# Patient Record
Sex: FEMALE | Race: WHITE | NOT HISPANIC OR LATINO | Employment: OTHER | URBAN - METROPOLITAN AREA
[De-identification: names, ages, dates, MRNs, and addresses within clinical notes are randomized per-mention and may not be internally consistent; named-entity substitution may affect disease eponyms.]

---

## 2017-01-26 ENCOUNTER — APPOINTMENT (OUTPATIENT)
Dept: MEDICAL GROUP | Facility: MEDICAL CENTER | Age: 82
End: 2017-01-26
Payer: MEDICARE

## 2017-08-22 ENCOUNTER — OFFICE VISIT (OUTPATIENT)
Dept: MEDICAL GROUP | Facility: MEDICAL CENTER | Age: 82
End: 2017-08-22
Payer: MEDICARE

## 2017-08-22 ENCOUNTER — TELEPHONE (OUTPATIENT)
Dept: MEDICAL GROUP | Facility: MEDICAL CENTER | Age: 82
End: 2017-08-22

## 2017-08-22 VITALS
DIASTOLIC BLOOD PRESSURE: 58 MMHG | WEIGHT: 99.6 LBS | SYSTOLIC BLOOD PRESSURE: 110 MMHG | TEMPERATURE: 98.4 F | HEART RATE: 74 BPM | OXYGEN SATURATION: 94 % | BODY MASS INDEX: 19.56 KG/M2 | HEIGHT: 60 IN

## 2017-08-22 DIAGNOSIS — E78.5 DYSLIPIDEMIA: Chronic | ICD-10-CM

## 2017-08-22 DIAGNOSIS — D50.0 IRON DEFICIENCY ANEMIA DUE TO CHRONIC BLOOD LOSS: Chronic | ICD-10-CM

## 2017-08-22 DIAGNOSIS — Z00.00 MEDICARE ANNUAL WELLNESS VISIT, SUBSEQUENT: ICD-10-CM

## 2017-08-22 DIAGNOSIS — K21.9 GASTROESOPHAGEAL REFLUX DISEASE WITHOUT ESOPHAGITIS: Chronic | ICD-10-CM

## 2017-08-22 DIAGNOSIS — Z91.81 RISK FOR FALLS: ICD-10-CM

## 2017-08-22 DIAGNOSIS — F03.90 DEMENTIA WITHOUT BEHAVIORAL DISTURBANCE, UNSPECIFIED DEMENTIA TYPE: Chronic | ICD-10-CM

## 2017-08-22 DIAGNOSIS — Z87.81 HISTORY OF COMPRESSION FRACTURE OF SPINE: Chronic | ICD-10-CM

## 2017-08-22 DIAGNOSIS — M85.80 OSTEOPENIA, UNSPECIFIED LOCATION: Chronic | ICD-10-CM

## 2017-08-22 PROCEDURE — G0439 PPPS, SUBSEQ VISIT: HCPCS | Performed by: INTERNAL MEDICINE

## 2017-08-22 ASSESSMENT — PATIENT HEALTH QUESTIONNAIRE - PHQ9: CLINICAL INTERPRETATION OF PHQ2 SCORE: 0

## 2017-08-22 NOTE — MR AVS SNAPSHOT
"        Arminda Cowart   2017 3:00 PM   Office Visit   MRN: [id number]    Department:  [facility]   Dept Phone:  [phone]    Description:  Female : 1923   Provider:  Franck Cordero M.D.           Reason for Visit     Annual Exam           Allergies as of 2017     Allergen Noted Reactions    Boniva [Ibandronate Sodium] 2009       Pt denies    Doxycycline 2009       Pt denies    Evista [Raloxifene Hydrochloride] 2009       Pt denies    Fosamax 2009       Pt denies    Lovastatin 2009       Pt denies    Penicillins 2009       \"possibly\" reaction unk  Pt denies    Sulfa Drugs 2009       Pt denies      You were diagnosed with     Risk for falls   [125715]       Medicare annual wellness visit, subsequent   [346724]       Dyslipidemia   [361254]       Osteopenia, unspecified location   [2660521]       Iron deficiency anemia due to chronic blood loss   [369729]       Dementia without behavioral disturbance, unspecified dementia type   [7000675]       Gastroesophageal reflux disease without esophagitis   [298362]         Vital Signs     Blood Pressure Pulse Temperature Height Weight Body Mass Index    110/58 mmHg 74 36.9 °C (98.4 °F) 1.524 m (5') 45.178 kg (99 lb 9.6 oz) 19.45 kg/m2    Oxygen Saturation Smoking Status                94% Never Smoker           Basic Information     Date Of Birth Sex Race Ethnicity Preferred Language    1923 Female White Non- English      Your appointments     Sep 28, 2017  2:00 PM   Established Patient with Franck Cordero M.D.   [facility])    [address]  [facility] [address] [phone]           You will be receiving a confirmation call a few days before your appointment from our automated call confirmation system.              Problem List              ICD-10-CM Priority Class Noted - Resolved    History of humerus fracture Z87.81   2009 " - Present    History of compression fracture of spine (Chronic) Z87.81   7/22/2009 - Present    Osteopenia (Chronic) M85.80   7/22/2009 - Present    Dyslipidemia (Chronic) E78.5   7/22/2009 - Present    Preventative health care (Chronic) Z00.00   7/22/2009 - Present    History of shingles Z86.19   5/1/2012 - Present    GERD (gastroesophageal reflux disease) (Chronic) K21.9   5/9/2012 - Present    Radial fracture S52.90XA   4/3/2014 - Present    Dementia without behavioral disturbance (Chronic) F03.90   2/18/2016 - Present    Anemia (Chronic) D64.9   2/20/2016 - Present      Health Maintenance        Date Due Completion Dates    IMM ZOSTER VACCINE 11/14/1983 ---    IMM PNEUMOCOCCAL 65+ (ADULT) LOW/MEDIUM RISK SERIES (1 of 2 - PCV13) 11/14/1988 ---    IMM DTaP/Tdap/Td Vaccine (1 - Tdap) 8/29/2009 8/28/2009    IMM INFLUENZA (1) 9/1/2017 11/5/2014    BONE DENSITY 11/6/2019 11/6/2014 (Declined), 5/6/2014 (Declined), 2/3/2010, 10/17/2007    Override on 11/6/2014: Patient Declined    Override on 5/6/2014: Patient Declined            Current Immunizations     Influenza TIV (IM) 11/5/2014    TD Vaccine 8/28/2009 11:18 PM      Below and/or attached are the medications your provider expects you to take. Review all of your home medications and newly ordered medications with your provider and/or pharmacist. Follow medication instructions as directed by your provider and/or pharmacist. Please keep your medication list with you and share with your provider. Update the information when medications are discontinued, doses are changed, or new medications (including over-the-counter products) are added; and carry medication information at all times in the event of emergency situations     Allergies:  BONIVA - (reactions not documented)     DOXYCYCLINE - (reactions not documented)     EVISTA - (reactions not documented)     FOSAMAX - (reactions not documented)     LOVASTATIN - (reactions not documented)     PENICILLINS - (reactions not  documented)     SULFA DRUGS - (reactions not documented)               Medications  Valid as of: August 22, 2017 -  3:52 PM    Generic Name Brand Name Tablet Size Instructions for use    Calcium (Tab) Calcium 150 MG Take  by mouth.        Cholecalciferol (Tab) cholecalciferol 1000 UNIT Take 4 Tabs by mouth every day.        Lidocaine (Patch) LIDODERM 5 % Apply 1 patch to affected area 12 hours on during the day and off at night        Multiple Vitamin   Take  by mouth.        .                 Medicines prescribed today were sent to:     [facility] #[id number] - [place], [place] - [address]    [address] [place] [address]    Phone: [phone] Fax: [phone]    Open 24 Hours?: No      Medication refill instructions:       If your prescription bottle indicates you have medication refills left, it is not necessary to call your provider’s office. Please contact your pharmacy and they will refill your medication.    If your prescription bottle indicates you do not have any refills left, you may request refills at any time through one of the following ways: The online [employer] system (except Urgent Care), by calling your provider’s office, or by asking your pharmacy to contact your provider’s office with a refill request. Medication refills are processed only during regular business hours and may not be available until the next business day. Your provider may request additional information or to have a follow-up visit with you prior to refilling your medication.   *Please Note: Medication refills are assigned a new Rx number when refilled electronically. Your pharmacy may indicate that no refills were authorized even though a new prescription for the same medication is available at the pharmacy. Please request the medicine by name with the pharmacy before contacting your provider for a refill.        Other Notes About Your Plan     Call son [phone] rasheeda daughter bradley [phone]  8/14/17 Complex  case coordination evaluation assisted living, patient could not tolerate Aricept , ask about home instead few hours per week, depends, need labs  Prevnar,dexa                       MyChart Access Code: Activation code not generated  Current MyChart Status: Active

## 2017-08-22 NOTE — PROGRESS NOTES
Subjective:      Arminda Cowart is a 89 y.o. female who presents with Annual Exam            Annual Exam      CC:  Health Risk Assessment Exam.    HPI:wellness assessment  [place] is a 89 y.o. here for Health Risk Assessment.     PCP: Franck Cordero M.D.  Lives at [facility] by self, patient is here with daughter  Patient has more issues with memory, daughter believes she has power of  over medical, legal affairs and patient has advanced directive at home  Has meals on wheels once per week and once a month house cleaning and son helps with grocery shopping  Walker for ambulation has fallen a few weeks ago when she tripped  Patient's son lives a few blocks away and talks with the patient daily also does the shopping  Patient has not been taking Aricept  Daughter has noticed more short-term memory issues          Patient Active Problem List    Diagnosis Date Noted   • Anemia 02/20/2016   • Dementia without behavioral disturbance 02/18/2016   • Radial fracture 04/03/2014   • GERD (gastroesophageal reflux disease) 05/09/2012   • History of shingles 05/01/2012   • History of humerus fracture 07/22/2009   • History of compression fracture of spine 07/22/2009   • Osteopenia 07/22/2009   • Dyslipidemia 07/22/2009   • Preventative health care 07/22/2009     Current Outpatient Prescriptions   Medication Sig Dispense Refill   • vitamin D (CHOLECALCIFEROL) 1000 UNIT Tab Take 4 Tabs by mouth every day. 30 Tab 0   • Calcium 150 MG TABS Take  by mouth.     • Multiple Vitamin (MULTI-VITAMIN DAILY PO) Take  by mouth.     • donepezil (ARICEPT) 10 MG tablet Take 1 Tab by mouth every day. 30 Tab 3   • lidocaine (LIDODERM) 5 % Patch Apply 1 patch to affected area 12 hours on during the day and off at night 90 Patch 1     No current facility-administered medications for this visit.      Current supplements reviewed  Chronic narcotic pain medicines: no  Allergies: Boniva; Doxycycline; Evista; Fosamax; Lovastatin; Penicillins;  and Sulfa drugs    Screening:reviewed  Depressive Symptoms: Denies feeling down, depressed or hopeless. Denies loss of interest or pleasure in doing things .  Denies depression  ADLs: Denies needing help with using telephone, cyanosis transportation, does prepare meals with [employer] Whe[employer] helping, housework once per month [employer], laundry, son manages finances  Independent with bathing, hygiene, feeding, toileting, dressing    Memory concerns: Denies difficulty remembering details of conversations, events and upcoming appointments.  Hearing problems: Denies.   Recent falls no    Current social contact/activities: reviewed  Ostomy or other tubes or amputations: no  Chronic oxygen use no     Gait: normal. Uses assistive device : walker         Health Care Screening recommendations reviewed with patient today and updated or ordered.  DPA/Advanced directive: Completed/Information provided.   Referrals for PT/OT/Nutrition counseling/Behavioral Health/Smoking cessation as above if indicated  Discussion today about general wellness and lifestyle habits:    · Prevent falls and reduce trip hazards; walker for ambulation  · Have a working fire alarm    · Does spend time with neighbors  · Rare outdoor activities  ·   ·     Current Outpatient Prescriptions   Medication Sig Dispense Refill   • donepezil (ARICEPT) 10 MG tablet Take 1 Tab by mouth every day. 30 Tab 3   • vitamin D (CHOLECALCIFEROL) 1000 UNIT Tab Take 4 Tabs by mouth every day. 30 Tab 0   • lidocaine (LIDODERM) 5 % Patch Apply 1 patch to affected area 12 hours on during the day and off at night 90 Patch 1   • Calcium 150 MG TABS Take  by mouth.     • Multiple Vitamin (MULTI-VITAMIN DAILY PO) Take  by mouth.       No current facility-administered medications for this visit.     Patient Active Problem List    Diagnosis Date Noted   • Anemia 02/20/2016   • Dementia without behavioral disturbance 02/18/2016   • Radial fracture 04/03/2014   • GERD (gastroesophageal  reflux disease) 05/09/2012   • History of shingles 05/01/2012   • History of humerus fracture 07/22/2009   • History of compression fracture of spine 07/22/2009   • Osteopenia 07/22/2009   • Dyslipidemia 07/22/2009   • Preventative health care 07/22/2009     Anemia  4/29/15 hgb 13,hct 41  2/19/16 hgb 7.0,hct 25,mcv 77,b12 36,folate >24,ESR 15, cmp, tsh normal  2/20/16 chest x-ray atelectasis, cardiomegaly  2/20/16 hospital admission, 2/21/16, hospital discharge  Diagnosis anemia, admitted with hemoglobin 6.7, transfuse one unit packed red blood cells, patient declined EGD and colonoscopy, recommend follow-up outpatient  2/20/16 hgb 6.7,hct 24,mcv 77,iron <10,%saturation not calculated due to iron <10,feritin 8.4  2/21/16 hgb 7.7,hct 26,mcv 80  3/3/16 hgb 8.8,hct 26  3/25/16 completed day 5/5 venofer iron infusion  5/5/16 hgb 12.6,hct 42,mcv 89,iron 46,%sat 18,ferritin 326  7/27/16 hgb 13.7,hct 44.7,iron 73,%sat 20,ferritin 67,b12 252,SPEP negative    Dementia  2/18/16 MMSE 20/30 labs and CT pending, if negative consider aricept  2/19/16 hgb 7.0,hct 25,mcv 77,b12 36,folate >24,ESR 15, cmp, tsh normal  7/26/16 start aricept 5 mg  8/16/16 increase to aricept 10 mg  8/14/17 not taking aricept nausea    Dyslipidemia  3/09 chol 196,trig 74,hdl 51,ldl 130  1/10 chol 229,trig 80,hdl 52,ldl 161  11/10 chol 235,trig 63,hdl 68,ldl 154 decline statin  8/11 chol 221,trig 63,hdl 57,ldl 151 decline statin  10/8/13 chol 204,trig 75,hdl 74,ldl 115   11/10/14 chol 208,trig 52,hdl 79,ldl 119    Gastroesophageal reflux  8/3/12 stop prilosec due to osteopenia and vertebral compression fractures    History compression fracture  1/08 lumbar spine x-ray, which compression deformity T11-T12  5/6/14 x-ray lumbar spine superior endplate irregularity L5 consistent with mild compression deformity uncertain age, no other compression fractures moderate to severe arthrosis  5/6/14 physical therapy for low back pain, mild L5 superior endplate  fracture, age unknown. Declines reclast or prolia  3/31/15 x-ray lumbar spine interval progression L5 superior endplate compression fracture with 30% height loss, chronic compression fractures of thoracic spine, DJD  4/1/15 consider tramadol for pain, calcitonin for pain from compression fracture after tramadol, ask her again about DEXA she has declined that and all treatment in the past hopefully pain can be controlled otherwise vertebral augmentation could be considered  4/27/15 dexa at [facility] LS -1.8,hip -1.8  4/28/15  orthopedic note, MRI lumbar spine reviewed moderate stenosis L4-L5, acute L5 fracture, we will proceed with kyphoplasty L5   4/29/15  kyphoplasty L5  4/29/15 xray lumbar spine, contrast material L5 consistent with kyphoplasty  5/12/15 dr.men pfeiffer ortho note; status post kyphoplasty L5, x-ray lumbar spine cement within L5 vertebral body, minimal leakage, still with pain from L4-L5 stenosis, consider epidural L4-L5, physical therapy  6/11/15 declines miacalcin  7/26/16 declines prolia and reclast    History humerus fracture  Sees dr.osgood  2/09 right humerus hemiarthroplasty rotator cuff repair     History shingles    Osteopenia  9/07 dexa LS -1.6,hip -1.4 intolerant of boniva,fosamax,evista,she needs to try reclast  1/10 vit d 34  2/10 dexa LS -1.8,hip -1.6  11/10 vit d 36   5/12 x-ray thoracic spine 2 chronic lower thoracic compression fractures T9 and T11, more superior compression fractures, resulting loss of height and kyphosis  5/9/12 I rec reclast, pt declines for now  5/19/12 start fosamax q week, rec also use zantac instead of prilosec  10/8/13 not taking fosamax, vit d 41  3/29/14 xray right wrist minimally impacted fracture of the distal radius, with slight posterior angulation of the major distal fragments  5/6/14 x-ray thoracic spine severe kyphosis, severe compression fracture midthoracic vertebral body no change, moderate compression fracture 2 levels below  without change. No new fractures are noted  5/6/14 x-ray lumbar spine superior endplate irregularity L5 consistent with mild compression deformity uncertain age, no other compression fractures moderate to severe arthrosis  5/6/14 declines reclast, prolia, dexa  11/6/14 declines bone density testing, declines reclast and prolia  11/10/14 vit d 38  3/31/15 x-ray lumbar spine interval progression L5 superior endplate compression fracture with 30% height loss, chronic compression fractures of thoracic spine, DJD  4/27/15 cbc,cmp,tsh normal, vit d 30, PTH 22.7  4/27/15 dexa at [facility] LS -1.8,hip -1.8  4/29/15  kyphoplasty L5   6/11/15 declines reclast or prolia  7/26/16 declines prolia or intravenous reclast  7/27/16 vit d 27, increase to 4000 units daily  intolerant of boniva,fosamax?,evista and declines reclast or prolia    Preventative health  2008 pneumovax  11/10/14 vit d 38  4/27/15 dexa at [facility] LS -1.8,hip -1.8  6/11/15 declines influenza vaccine, pneumococcal vaccine, shingles vaccine  7/26/16 written prevnar prescription provided to get at pharmacy  7/27/16 vit d 27, increase to 4000 units daily    Radial fracture  3/29/14 xray right wrist minimally impacted fracture of the distal radius, with slight posterior angulation of the major distal fragments  4/3/14  ortho note short armed splint  4/16/14  ortho note, cont splint  5/14/14  ortho note,cont splint follow up one month  6/4/14  ortho note        Depression Screening    Little interest or pleasure in doing things?  0 - not at all  Feeling down, depressed , or hopeless? 0 - not at all  Patient Health Questionnaire Score: 0     If depressive symptoms identified deferred to follow up visit unless specifically addressed in assessment and plan.    Interpretation of PHQ-9 Total Score   Score Severity   1-4 No Depression   5-9 Mild Depression   10-14 Moderate Depression   15-19 Moderately Severe Depression   20-27  Severe Depression    Screening for Cognitive Impairment    Three Minute Recall (apple, watch, simon)  1/3    Draw clock face with all 12 numbers set to the hand to show 10 minutes past 11 o'clock  0    Cognitive concerns identified deferred for follow up unless specifically addressed in assessment and plan.    Fall Risk Assessment    Has the patient had two or more falls in the last year or any fall with injury in the last year?  Yes    Safety Assessment    Throw rugs on floor.  No  Handrails on all stairs.  Yes  Good lighting in all hallways.  Yes  Difficulty hearing.  No  Patient counseled about all safety risks that were identified.    Functional Assessment ADLs    Are there any barriers preventing you from cooking for yourself or meeting nutritional needs?  Yes.    Are there any barriers preventing you from driving safely or obtaining transportation?  Yes.    Are there any barriers preventing you from using a telephone or calling for help?  No.    Are there any barriers preventing you from shopping?  No.    Are there any barriers preventing you from taking care of your own finances?  Yes.    Are there any barriers preventing you from managing your medications?  Yes.    Are currently engaging any exercise or physical activity?  Yes.       Health Maintenance Summary                IMM ZOSTER VACCINE Overdue 11/14/1983     IMM PNEUMOCOCCAL 65+ (ADULT) LOW/MEDIUM RISK SERIES Overdue 11/14/1988     IMM DTaP/Tdap/Td Vaccine Overdue 8/29/2009      Done 8/28/2009 Imm Admin: TD Vaccine    Annual Wellness Visit Overdue 10/8/2014      Done 10/7/2013     IMM INFLUENZA Next Due 9/1/2017      Done 11/5/2014 Imm Admin: Influenza TIV (IM)    BONE DENSITY Next Due 11/6/2019      Patient Declined 11/6/2014      Patient has more history with this topic...          Patient Care Team:  Franck Cordero M.D. as PCP - General          ROS       Objective:          Physical Exam   Constitutional: She appears well-developed and  well-nourished. No distress.   HENT:   Head: Normocephalic and atraumatic.   Eyes: Conjunctivae are normal. Right eye exhibits no discharge. Left eye exhibits no discharge. No scleral icterus.   Neck: No JVD present. No thyromegaly present.   Cardiovascular: Normal rate and regular rhythm.    Pulmonary/Chest: Effort normal and breath sounds normal. No respiratory distress. She has no wheezes.   Abdominal: She exhibits no distension.   Musculoskeletal: She exhibits no edema.   Skin: Skin is warm. She is not diaphoretic.   Psychiatric: She has a normal mood and affect. Her behavior is normal.   Nursing note and vitals reviewed.    Head no trauma, no tenderness  Patient's repetitive, no hallucinations or delusions  Answers questions appropriately          Assessment/Plan:     Assessment  #! Wellness assessment    #2 dementia could not tolerate Aricept because of nausea, lives independently but needs assistance son lives in the same apartment complex and daughter visits from out of state once a month, short-term memory loss, no change in behavior, some diminished function     #3 History compression fracture status post kyphoplasty 5/12/15 dr.men pfeiffer ortho note; status post kyphoplasty L5, x-ray lumbar spine cement within L5 vertebral body, minimal leakage, still with pain from L4-L5 stenosis, consider epidural L4-L5, physical therapy, previously has declined medication 7/26/16 declines prolia and reclast    #4 History humerus fracture 2/09 right humerus hemiarthroplasty rotator cuff repair , walker for ambulation    #5 osteopenia has declined medication 7/26/16 declines prolia or intravenous reclast     #6 Preventative health  2008 pneumovax  11/10/14 vit d 38  4/27/15 dexa at [facility] LS -1.8,hip -1.8  6/11/15 declines influenza vaccine, pneumococcal vaccine, shingles vaccine  7/26/16 written prevnar prescription provided to get at pharmacy  7/27/16 vit d 27, increase to 4000 units daily    #7 fall risk     Plan  #!  Need copy of the medical power of , spoke to daughter and son later on this evening, he will bring a copy to the next visit power of , as well as advanced directive    #2 health maintenance reviewed and updated    #3 annual influenza vaccine    #4 note to  to contact patient's son to see if any other assistance is necessary, spoke to son this evening he will try to arrange home instead, already has meals on wheels and [employer] once a month    #5 due for dexa but she has declined medications previously    #6 no further mammogram    #7 recommend pneumonia vaccine, she will consider    #8 follow-up 4 weeks with patient son and daughter    #9 I spent over half of the appointment the importance of power of , advanced directive, potentially getting home aide assistance or transitioning to assisted living, this was explained to both the patient and the daughter multiple times, emphasize fall risk precautions, using walker at all times, patient already has [employer] screening, patient does have shower bars and shower chair    #10 no need for hearing test at this time, patient declines physical therapy    #11 consider exelon patch

## 2017-08-23 ENCOUNTER — PATIENT OUTREACH (OUTPATIENT)
Dept: HEALTH INFORMATION MANAGEMENT | Facility: OTHER | Age: 82
End: 2017-08-23

## 2017-08-23 ENCOUNTER — TELEPHONE (OUTPATIENT)
Dept: MEDICAL GROUP | Facility: MEDICAL CENTER | Age: 82
End: 2017-08-23

## 2017-08-23 DIAGNOSIS — Z59.9 INADEQUATE COMMUNITY RESOURCES: ICD-10-CM

## 2017-08-23 SDOH — ECONOMIC STABILITY - INCOME SECURITY: PROBLEM RELATED TO HOUSING AND ECONOMIC CIRCUMSTANCES, UNSPECIFIED: Z59.9

## 2017-08-23 NOTE — TELEPHONE ENCOUNTER
Spoke to son Satish bautista at [phone], he will attempt to set up [employer] assistance, already has Meals on Wheels once a week, patient cannot afford assisted living    Will refer to  since she has Medicare and complex case coordination is not covered.  Son agrees

## 2017-08-23 NOTE — TELEPHONE ENCOUNTER
----- Message from Patricia Washington sent at 8/23/2017 11:54 AM PDT -----  Regarding: RE:  consult  Dr. Cordero,     The type of Medicare she has does in fact have complex care coordination as one of their benefits. So no worries there :)     It's certain types of Medicare, which I know can be confusing. If you have anyone who you think needs complex case coordination, send the referral through the work que and we will determine if their insurance is contracted with our services. That way you don't get confused with who we do follow and who we don't follow. I hope this helps.     Thanks,   Patricia     ----- Message -----     From: Franck Cordero M.D.     Sent: 8/22/2017   9:23 PM       To: Patricia Washington  Subject:  consult                          Patricia,     I apologize for sending you messages but many of my patients have Medicare, and complex case coordination is not covered for the Medicare patients.    Ms. Cowart lives alone, her son lives nearby and tries to assist as much as possible, would you mind contacting him to see what services are available?    Apparently assisted living is not within her financial availability but ultimately that is what she may require.    His name is Satish and I notified him that you may be contacting him.  He is available at [phone].    Thank you again for all of your assistance.    Best regards,  Franck

## 2017-08-28 ENCOUNTER — PATIENT OUTREACH (OUTPATIENT)
Dept: HEALTH INFORMATION MANAGEMENT | Facility: OTHER | Age: 82
End: 2017-08-28

## 2017-08-28 NOTE — PROGRESS NOTES
1st attempt: New referral from provider indicating pt may be in need of further community resources, including assisted living.     Outreach to pt to discuss above referral. Pt's daughter Hansa answered and informed this LSW that she makes decisions for pt. LSW inquired if pt is alert and oriented. Daughter reports that she has some forgetfulness, but that she is alert and oriented. LSW discussed that because pt is alert and oriented, LSW would need permission from pt to discuss pt's medical/social care with daughter. LSW informed daughter that LSW could provide her with general community resources for them to provide to patient; however if they had any specific referrals or were in need of pt's information, LSW will need permission from patient. Daughter agreeable and requested LSW contact information to call back when she is not driving. Hansa states she will be back in town on 9/28/2017 to assist patient with stuff at home. Hansa states she will call this LSW back in the next day or so to ask some questions and possibly get some general community resources.

## 2017-08-29 ENCOUNTER — PATIENT OUTREACH (OUTPATIENT)
Dept: HEALTH INFORMATION MANAGEMENT | Facility: OTHER | Age: 82
End: 2017-08-29

## 2017-08-29 NOTE — PROGRESS NOTES
LSW received VM from pt's daughter Hansa requesting TC back to discuss community resources available.     Outreach to pt's daughter returning her VM. Hansa reports that currently pt has [employer] for  services, [employer] for cleaning, and [employer] for home delivered meals. She states that pt is currently living at [facility]. She reports that pt does not have any ADL needs, but states she anticipates that pt will need more assistance in the future. She reports that her and pt have looked at [facility] and pt may be interested in this facility.     Daughter requesting information on group homes in case pt is unable to pay for an assisted living facility long term. LSW to send link to online search for group homes to pt's daughter. LSW discussed some of the state/[facility] programs for assistance with ADL/IADL needs should pt's needs change and she is unable to privately pay for services.     Email to daughter as follows:        Good Afternoon,     As per our conversation, here is a link to look up group homes based on location. In order to find groups home you will choose the “Business Unit” as Health Facilities and then the “Credential Type” as either: 1) Residential facility for groups or 2) Home for individual residential care.     [email or web address]    If you have any questions/concerns, you may reach me at the number or email listed below.     Thanks,  Patricia Washington, LSW, MSW

## 2017-09-28 ENCOUNTER — TELEPHONE (OUTPATIENT)
Dept: MEDICAL GROUP | Facility: MEDICAL CENTER | Age: 82
End: 2017-09-28

## 2017-09-28 ENCOUNTER — OFFICE VISIT (OUTPATIENT)
Dept: MEDICAL GROUP | Facility: MEDICAL CENTER | Age: 82
End: 2017-09-28
Payer: MEDICARE

## 2017-09-28 VITALS
TEMPERATURE: 98 F | BODY MASS INDEX: 22.04 KG/M2 | WEIGHT: 98 LBS | HEIGHT: 56 IN | OXYGEN SATURATION: 93 % | SYSTOLIC BLOOD PRESSURE: 106 MMHG | HEART RATE: 83 BPM | DIASTOLIC BLOOD PRESSURE: 68 MMHG

## 2017-09-28 DIAGNOSIS — Z87.81 HISTORY OF HUMERUS FRACTURE: ICD-10-CM

## 2017-09-28 DIAGNOSIS — E78.5 DYSLIPIDEMIA: Chronic | ICD-10-CM

## 2017-09-28 DIAGNOSIS — D50.0 IRON DEFICIENCY ANEMIA DUE TO CHRONIC BLOOD LOSS: Chronic | ICD-10-CM

## 2017-09-28 DIAGNOSIS — M85.80 OSTEOPENIA, UNSPECIFIED LOCATION: Chronic | ICD-10-CM

## 2017-09-28 DIAGNOSIS — Z23 NEEDS FLU SHOT: ICD-10-CM

## 2017-09-28 DIAGNOSIS — F03.90 DEMENTIA WITHOUT BEHAVIORAL DISTURBANCE, UNSPECIFIED DEMENTIA TYPE: Chronic | ICD-10-CM

## 2017-09-28 PROCEDURE — 99214 OFFICE O/P EST MOD 30 MIN: CPT | Mod: 25 | Performed by: INTERNAL MEDICINE

## 2017-09-28 PROCEDURE — 90662 IIV NO PRSV INCREASED AG IM: CPT | Performed by: INTERNAL MEDICINE

## 2017-09-28 PROCEDURE — G0008 ADMIN INFLUENZA VIRUS VAC: HCPCS | Performed by: INTERNAL MEDICINE

## 2017-09-28 NOTE — PROGRESS NOTES
Subjective:      Arminda Cowart is a 89 y.o. female who presents with memory Follow-Up            HPI     Patient with history of Alzheimer's dementia, here with son and daughter who have power of .  Patient lives by herself has Me[employer] Wheels, son lives close by and monitors situation, will be moving into assisted living [facility] a few months, and needs forms completed as well as POLST.  Patient has not advanced directive states she is DO NOT RESUSCITATE, daughter and son agree.  Patient has memory loss, has tried Aricept which caused nausea, short-term memory diminished, no hallucinations or delusions, no change in function.  Able to perform ADLs without assistance.  Denies depression.  No mood changes.  No anxiety or stress.  Walker for ambulation, no falls.  No tobacco, no alcohol          Current Outpatient Prescriptions   Medication Sig Dispense Refill   • vitamin D (CHOLECALCIFEROL) 1000 UNIT Tab Take 4 Tabs by mouth every day. 30 Tab 0   • Calcium 150 MG TABS Take  by mouth.     • Multiple Vitamin (MULTI-VITAMIN DAILY PO) Take  by mouth.     • lidocaine (LIDODERM) 5 % Patch Apply 1 patch to affected area 12 hours on during the day and off at night 90 Patch 1     No current facility-administered medications for this visit.      Patient Active Problem List    Diagnosis Date Noted   • Anemia 02/20/2016   • Dementia without behavioral disturbance 02/18/2016   • Radial fracture 04/03/2014   • GERD (gastroesophageal reflux disease) 05/09/2012   • History of shingles 05/01/2012   • History of humerus fracture 07/22/2009   • History of compression fracture of spine 07/22/2009   • Osteopenia 07/22/2009   • Dyslipidemia 07/22/2009   • Preventative health care 07/22/2009          Anemia  4/29/15 hgb 13,hct 41  2/19/16 hgb 7.0,hct 25,mcv 77,b12 36,folate >24,ESR 15, cmp, tsh normal  2/20/16 chest x-ray atelectasis, cardiomegaly  2/20/16 hospital admission, 2/21/16, hospital discharge  Diagnosis anemia,  admitted with hemoglobin 6.7, transfuse one unit packed red blood cells, patient declined EGD and colonoscopy, recommend follow-up outpatient  2/20/16 hgb 6.7,hct 24,mcv 77,iron <10,%saturation not calculated due to iron <10,feritin 8.4  2/21/16 hgb 7.7,hct 26,mcv 80  3/3/16 hgb 8.8,hct 26  3/25/16 completed day 5/5 venofer iron infusion  5/5/16 hgb 12.6,hct 42,mcv 89,iron 46,%sat 18,ferritin 326  7/27/16 hgb 13.7,hct 44.7,iron 73,%sat 20,ferritin 67,b12 252,SPEP negative     Dementia  2/18/16 MMSE 20/30 labs and CT pending, if negative consider aricept  2/19/16 hgb 7.0,hct 25,mcv 77,b12 36,folate >24,ESR 15, cmp, tsh normal  7/26/16 start aricept 5 mg  8/16/16 increase to aricept 10 mg  8/14/17 not taking aricept nausea     Dyslipidemia  3/09 chol 196,trig 74,hdl 51,ldl 130  1/10 chol 229,trig 80,hdl 52,ldl 161  11/10 chol 235,trig 63,hdl 68,ldl 154 decline statin  8/11 chol 221,trig 63,hdl 57,ldl 151 decline statin  10/8/13 chol 204,trig 75,hdl 74,ldl 115   11/10/14 chol 208,trig 52,hdl 79,ldl 119     Gastroesophageal reflux  8/3/12 stop prilosec due to osteopenia and vertebral compression fractures     History compression fracture  1/08 lumbar spine x-ray, which compression deformity T11-T12  5/6/14 x-ray lumbar spine superior endplate irregularity L5 consistent with mild compression deformity uncertain age, no other compression fractures moderate to severe arthrosis  5/6/14 physical therapy for low back pain, mild L5 superior endplate fracture, age unknown. Declines reclast or prolia  3/31/15 x-ray lumbar spine interval progression L5 superior endplate compression fracture with 30% height loss, chronic compression fractures of thoracic spine, DJD  4/1/15 consider tramadol for pain, calcitonin for pain from compression fracture after tramadol, ask her again about DEXA she has declined that and all treatment in the past hopefully pain can be controlled otherwise vertebral augmentation could be considered  4/27/15  dexa at [facility] LS -1.8,hip -1.8  4/28/15  orthopedic note, MRI lumbar spine reviewed moderate stenosis L4-L5, acute L5 fracture, we will proceed with kyphoplasty L5   4/29/15  kyphoplasty L5  4/29/15 xray lumbar spine, contrast material L5 consistent with kyphoplasty  5/12/15 dr.men pfeiffer ortho note; status post kyphoplasty L5, x-ray lumbar spine cement within L5 vertebral body, minimal leakage, still with pain from L4-L5 stenosis, consider epidural L4-L5, physical therapy  6/11/15 declines miacalcin  7/26/16 declines prolia and reclast     History humerus fracture  Sees dr.osgood  2/09 right humerus hemiarthroplasty rotator cuff repair      History shingles     Osteopenia  9/07 dexa LS -1.6,hip -1.4 intolerant of boniva,fosamax,evista,she needs to try reclast  1/10 vit d 34  2/10 dexa LS -1.8,hip -1.6  11/10 vit d 36   5/12 x-ray thoracic spine 2 chronic lower thoracic compression fractures T9 and T11, more superior compression fractures, resulting loss of height and kyphosis  5/9/12 I rec reclast, pt declines for now  5/19/12 start fosamax q week, rec also use zantac instead of prilosec  10/8/13 not taking fosamax, vit d 41  3/29/14 xray right wrist minimally impacted fracture of the distal radius, with slight posterior angulation of the major distal fragments  5/6/14 x-ray thoracic spine severe kyphosis, severe compression fracture midthoracic vertebral body no change, moderate compression fracture 2 levels below without change. No new fractures are noted  5/6/14 x-ray lumbar spine superior endplate irregularity L5 consistent with mild compression deformity uncertain age, no other compression fractures moderate to severe arthrosis  5/6/14 declines reclast, prolia, dexa  11/6/14 declines bone density testing, declines reclast and prolia  11/10/14 vit d 38  3/31/15 x-ray lumbar spine interval progression L5 superior endplate compression fracture with 30% height loss, chronic compression  "fractures of thoracic spine, DJD  4/27/15 cbc,cmp,tsh normal, vit d 30, PTH 22.7  4/27/15 dexa at [facility] LS -1.8,hip -1.8  4/29/15  kyphoplasty L5   6/11/15 declines reclast or prolia  7/26/16 declines prolia or intravenous reclast  7/27/16 vit d 27, increase to 4000 units daily  intolerant of boniva,fosamax?,evista and declines reclast or prolia     Preventative health  2008 pneumovax  11/10/14 vit d 38  4/27/15 dexa at [facility] LS -1.8,hip -1.8  6/11/15 declines influenza vaccine, pneumococcal vaccine, shingles vaccine  7/26/16 written prevnar prescription provided to get at pharmacy  7/27/16 vit d 27, increase to 4000 units daily     Radial fracture  3/29/14 xray right wrist minimally impacted fracture of the distal radius, with slight posterior angulation of the major distal fragments  4/3/14  ortho note short armed splint  4/16/14  ortho note, cont splint  5/14/14  ortho note,cont splint follow up one month  6/4/14  ortho note       ROS       Objective:     /68   Pulse 83   Temp 36.7 °C (98 °F)   Ht 1.422 m (4' 8\")   Wt 44.5 kg (98 lb)   SpO2 93%   BMI 21.97 kg/m²      Physical Exam   Constitutional: No distress.   HENT:   Head: Normocephalic and atraumatic.   Eyes: Conjunctivae are normal. Left eye exhibits no discharge.   Neck: No thyromegaly present.   Cardiovascular: Normal rate and regular rhythm.    Pulmonary/Chest: Effort normal and breath sounds normal. She has no rales.   Abdominal: She exhibits no distension.   Skin: Skin is warm. She is not diaphoretic. No erythema.   Psychiatric: She has a normal mood and affect. Her behavior is normal.   Nursing note and vitals reviewed.    Pleasant, normal affect, alert to year, city, and person  No hallucinations or delusions  Some repetitive statements  Denies depression      Appropriate to year, city of residence, self name       Assessment/Plan:     Assessment  #1 dementia without behavioral disturbance, " son and daughter have power of  and her here today with patient, could not tolerate Aricept, short-term memory diminished, patient alert to year, city of residence and name, no behavioral disturbances, still able to function with ADLs, living independently with [employer] and son nearby    #2 advanced directive, DO NOT RESUSCITATE patient, son and daughter agree to this        Plan  #1 POLST form completed with patient, son, daughter    #2 copy in chart    #3 patient is DO NOT RESUSCITATE    #4 from for [facility] completed    #5 spent 40 minutes with patient, son and daughter discussing advanced directive, POLST, transition to assisted living, mood, behavior, function, falling precaution, ADLs, memory loss, medications    #6 follow-up 4 months    #7 influenza vaccine

## 2017-10-23 ENCOUNTER — TELEPHONE (OUTPATIENT)
Dept: MEDICAL GROUP | Facility: MEDICAL CENTER | Age: 82
End: 2017-10-23

## 2017-10-23 NOTE — TELEPHONE ENCOUNTER
1. Caller Name: Hansa (dtr)                      Call Back Number: [phone] (home)       2. Message: [place] needs work order to have brakes on her walker fixed. Have already left a message for Hansa to call me and let me know where to send work order.     3. Patient approves office to leave a detailed voicemail/MyChart message: no

## 2017-10-24 NOTE — TELEPHONE ENCOUNTER
Order written on the counter, need to await daughter's call back and information regarding the company we need to fax information to...

## 2017-10-24 NOTE — TELEPHONE ENCOUNTER
Spoke with Hansa, she has not rec'd this info from her brother yet. Will leave info on my VM when she gets it.

## 2017-10-26 NOTE — TELEPHONE ENCOUNTER
Daughter Hansa [phone] left a message, she would like the order for the walker sent to [employer] I have the order on the desk counter to fax

## 2017-11-10 ENCOUNTER — PATIENT OUTREACH (OUTPATIENT)
Dept: HEALTH INFORMATION MANAGEMENT | Facility: OTHER | Age: 82
End: 2017-11-10

## 2017-11-10 NOTE — PROGRESS NOTES
Attempt #:1    WebIZ Checked & Epic Updated: Yes  · WebIZ Recommendations: PREVNAR (PCV13) , TDAP and ZOSTAVAX (Shingles)  · Is patient due for Tdap? YES. Patient was not notified of copay/out of pocket cost.  · Is patient due for Shingles? YES. Patient was not notified of copay/out of pocket cost.  HealthConnect Verified: no  Verify PCP: yes    Communication Preference Obtained: yes     Review Care Team: yes    Annual Wellness Visit Scheduling  1. Scheduling Status:Not Scheduled. Patient states they are not interested     Care Gap Scheduling (Attempt to Schedule EACH Overdue Care Gap!)     Health Maintenance Due   Topic Date Due   • IMM ZOSTER VACCINE  11/14/1983   • IMM PNEUMOCOCCAL 65+ (ADULT) LOW/MEDIUM RISK SERIES (1 of 2 - PCV13) 11/14/1988   • IMM DTaP/Tdap/Td Vaccine (1 - Tdap) 08/29/2009         [employer] Activation: already active  [employer] Radha: no  Virtual Visits: no  Opt In to Text Messages: no

## 2017-12-20 ENCOUNTER — TELEPHONE (OUTPATIENT)
Dept: MEDICAL GROUP | Facility: MEDICAL CENTER | Age: 82
End: 2017-12-20

## 2017-12-20 DIAGNOSIS — Z11.1 TUBERCULOSIS SCREENING: ICD-10-CM

## 2017-12-20 DIAGNOSIS — E55.9 HYPOVITAMINOSIS D: ICD-10-CM

## 2017-12-20 DIAGNOSIS — E78.5 DYSLIPIDEMIA: Chronic | ICD-10-CM

## 2017-12-20 NOTE — TELEPHONE ENCOUNTER
Please notify daughter that:  (1) I have placed the orders in the computer for the blood test including QuantiFERON TB screening  (2) I will print the [employer] form and addend the date  (3) thank you for checking with Reeherwillie to follow up on the walker

## 2017-12-20 NOTE — TELEPHONE ENCOUNTER
1. Caller Name: Daughter  Hansa                                       Call Back Number: [phone]       Patient approves a detailed voicemail message: yes    Pt's daughter is calling to report [facility] finally has an opening and they are needing to have orders placed to have QuantiFeron test ordered as well as find out if you have any labs you would like Pt to complete. Pt's [facility] [employer] application needs to be dated no longer than 30 days prior to admission per Hansa, asking if the date can be updated. Hansa is also requesting for us to contact the [employer] where they received Pt's Walker as the brakes do not work and this company is not returning their calls.     [facility] [phone] and spoke to Traci. Per Traci they will reach out to Pt's Son as he is local and schedule an apt to either fix the brakes or replace the unit. Notified Hansa of this information.

## 2017-12-21 ENCOUNTER — TELEPHONE (OUTPATIENT)
Dept: MEDICAL GROUP | Facility: MEDICAL CENTER | Age: 82
End: 2017-12-21

## 2017-12-21 ENCOUNTER — HOSPITAL ENCOUNTER (OUTPATIENT)
Dept: LAB | Facility: MEDICAL CENTER | Age: 82
End: 2017-12-21
Attending: INTERNAL MEDICINE
Payer: MEDICARE

## 2017-12-21 DIAGNOSIS — E78.5 DYSLIPIDEMIA: Chronic | ICD-10-CM

## 2017-12-21 DIAGNOSIS — Z11.1 TUBERCULOSIS SCREENING: ICD-10-CM

## 2017-12-21 DIAGNOSIS — E55.9 HYPOVITAMINOSIS D: ICD-10-CM

## 2017-12-21 LAB
25(OH)D3 SERPL-MCNC: 27 NG/ML (ref 30–100)
ALBUMIN SERPL BCP-MCNC: 4 G/DL (ref 3.2–4.9)
ALBUMIN/GLOB SERPL: 1.4 G/DL
ALP SERPL-CCNC: 52 U/L (ref 30–99)
ALT SERPL-CCNC: 9 U/L (ref 2–50)
ANION GAP SERPL CALC-SCNC: 6 MMOL/L (ref 0–11.9)
AST SERPL-CCNC: 19 U/L (ref 12–45)
BASOPHILS # BLD AUTO: 0.2 % (ref 0–1.8)
BASOPHILS # BLD: 0.01 K/UL (ref 0–0.12)
BILIRUB SERPL-MCNC: 0.6 MG/DL (ref 0.1–1.5)
BUN SERPL-MCNC: 15 MG/DL (ref 8–22)
CALCIUM SERPL-MCNC: 9.7 MG/DL (ref 8.5–10.5)
CHLORIDE SERPL-SCNC: 104 MMOL/L (ref 96–112)
CO2 SERPL-SCNC: 27 MMOL/L (ref 20–33)
CREAT SERPL-MCNC: 0.7 MG/DL (ref 0.5–1.4)
EOSINOPHIL # BLD AUTO: 0.13 K/UL (ref 0–0.51)
EOSINOPHIL NFR BLD: 2.9 % (ref 0–6.9)
ERYTHROCYTE [DISTWIDTH] IN BLOOD BY AUTOMATED COUNT: 46.2 FL (ref 35.9–50)
GFR SERPL CREATININE-BSD FRML MDRD: >60 ML/MIN/1.73 M 2
GLOBULIN SER CALC-MCNC: 2.9 G/DL (ref 1.9–3.5)
GLUCOSE SERPL-MCNC: 86 MG/DL (ref 65–99)
HCT VFR BLD AUTO: 42.9 % (ref 37–47)
HGB BLD-MCNC: 13 G/DL (ref 12–16)
IMM GRANULOCYTES # BLD AUTO: 0.01 K/UL (ref 0–0.11)
IMM GRANULOCYTES NFR BLD AUTO: 0.2 % (ref 0–0.9)
LYMPHOCYTES # BLD AUTO: 1.32 K/UL (ref 1–4.8)
LYMPHOCYTES NFR BLD: 29.1 % (ref 22–41)
MCH RBC QN AUTO: 27.8 PG (ref 27–33)
MCHC RBC AUTO-ENTMCNC: 30.3 G/DL (ref 33.6–35)
MCV RBC AUTO: 91.9 FL (ref 81.4–97.8)
MONOCYTES # BLD AUTO: 0.41 K/UL (ref 0–0.85)
MONOCYTES NFR BLD AUTO: 9.1 % (ref 0–13.4)
NEUTROPHILS # BLD AUTO: 2.65 K/UL (ref 2–7.15)
NEUTROPHILS NFR BLD: 58.5 % (ref 44–72)
NRBC # BLD AUTO: 0 K/UL
NRBC BLD-RTO: 0 /100 WBC
PLATELET # BLD AUTO: 225 K/UL (ref 164–446)
PMV BLD AUTO: 9.3 FL (ref 9–12.9)
POTASSIUM SERPL-SCNC: 4 MMOL/L (ref 3.6–5.5)
PROT SERPL-MCNC: 6.9 G/DL (ref 6–8.2)
RBC # BLD AUTO: 4.67 M/UL (ref 4.2–5.4)
SODIUM SERPL-SCNC: 137 MMOL/L (ref 135–145)
TSH SERPL DL<=0.005 MIU/L-ACNC: 2.74 UIU/ML (ref 0.38–5.33)
WBC # BLD AUTO: 4.5 K/UL (ref 4.8–10.8)

## 2017-12-21 PROCEDURE — 85025 COMPLETE CBC W/AUTO DIFF WBC: CPT

## 2017-12-21 PROCEDURE — 80053 COMPREHEN METABOLIC PANEL: CPT

## 2017-12-21 PROCEDURE — 82306 VITAMIN D 25 HYDROXY: CPT

## 2017-12-21 PROCEDURE — 84443 ASSAY THYROID STIM HORMONE: CPT

## 2017-12-21 PROCEDURE — 36415 COLL VENOUS BLD VENIPUNCTURE: CPT

## 2017-12-21 PROCEDURE — 86480 TB TEST CELL IMMUN MEASURE: CPT

## 2017-12-22 ENCOUNTER — TELEPHONE (OUTPATIENT)
Dept: MEDICAL GROUP | Facility: MEDICAL CENTER | Age: 82
End: 2017-12-22

## 2017-12-22 NOTE — TELEPHONE ENCOUNTER
Called the number given in message, Spoke with Satish. Advised him of results and recommendations. Satish will also update Hansa.

## 2017-12-22 NOTE — TELEPHONE ENCOUNTER
----- Message from Franck Cordero M.D. sent at 12/21/2017  8:29 PM PST -----  Please notify daughter bradley [phone] with results:  (1) Patient's liver function, kidney function, thyroid function and blood counts were normal  (2) The QuantiFERON gold test will take another week to come back, we will notify her of the results  (3) her vitamin D is a little low, vitamin D is important for good bone health and bone strength, I am going to add an extra 2000 units of vitamin D to her regimen, we will fax an order to [facility]

## 2017-12-22 NOTE — TELEPHONE ENCOUNTER
Please notify daughter bradley [phone] with results:  (1) Patient's liver function, kidney function, thyroid function and blood counts were normal  (2) The QuantiFERON gold test will take another week to come back, we will notify her of the results  (3) her vitamin D is a little low, vitamin D is important for good bone health and bone strength, I am going to add an extra 2000 units of vitamin D to her regimen, we will fax an order to [facility]

## 2017-12-25 ENCOUNTER — TELEPHONE (OUTPATIENT)
Dept: MEDICAL GROUP | Facility: MEDICAL CENTER | Age: 82
End: 2017-12-25

## 2017-12-25 LAB
M TB TUBERC IFN-G BLD QL: NEGATIVE
M TB TUBERC IFN-G/MITOGEN IGNF BLD: 0.01
M TB TUBERC IGNF/MITOGEN IGNF CONTROL: 52.96 [IU]/ML
MITOGEN IGNF BCKGRD COR BLD-ACNC: 0.04 [IU]/ML

## 2017-12-26 ENCOUNTER — TELEPHONE (OUTPATIENT)
Dept: MEDICAL GROUP | Facility: MEDICAL CENTER | Age: 82
End: 2017-12-26

## 2017-12-26 NOTE — TELEPHONE ENCOUNTER
----- Message from Franck Cordero M.D. sent at 12/25/2017 11:05 PM PST -----  Please notify daughter Hansa at [phone] that her mother's QuantiFERON TB test is negative    We will fax a copy to [facility]

## 2018-02-05 ENCOUNTER — OFFICE VISIT (OUTPATIENT)
Dept: MEDICAL GROUP | Facility: MEDICAL CENTER | Age: 83
End: 2018-02-05
Payer: MEDICARE

## 2018-02-05 ENCOUNTER — TELEPHONE (OUTPATIENT)
Dept: MEDICAL GROUP | Facility: MEDICAL CENTER | Age: 83
End: 2018-02-05

## 2018-02-05 VITALS
SYSTOLIC BLOOD PRESSURE: 140 MMHG | OXYGEN SATURATION: 93 % | BODY MASS INDEX: 21.5 KG/M2 | RESPIRATION RATE: 16 BRPM | HEART RATE: 80 BPM | HEIGHT: 56 IN | TEMPERATURE: 98.6 F | DIASTOLIC BLOOD PRESSURE: 64 MMHG | WEIGHT: 95.6 LBS
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
## 2018-02-05 DIAGNOSIS — R07.89 CHEST WALL PAIN: ICD-10-CM

## 2018-02-05 PROCEDURE — 99214 OFFICE O/P EST MOD 30 MIN: CPT | Performed by: NURSE PRACTITIONER

## 2018-02-06 ENCOUNTER — HOSPITAL ENCOUNTER (OUTPATIENT)
Dept: RADIOLOGY | Facility: MEDICAL CENTER | Age: 83
End: 2018-02-06
Attending: NURSE PRACTITIONER
Payer: MEDICARE

## 2018-02-06 ENCOUNTER — TELEPHONE (OUTPATIENT)
Dept: MEDICAL GROUP | Facility: MEDICAL CENTER | Age: 83
End: 2018-02-06

## 2018-02-06 DIAGNOSIS — R07.89 CHEST WALL PAIN: ICD-10-CM

## 2018-02-06 PROBLEM — S22.39XA RIB FRACTURE: Status: ACTIVE | Noted: 2018-02-06

## 2018-02-06 PROCEDURE — 71100 X-RAY EXAM RIBS UNI 2 VIEWS: CPT | Mod: RT

## 2018-02-06 NOTE — TELEPHONE ENCOUNTER
Please inform son, Satish, there is no record of her having received pneumococcal vaccine either with the pharmacy or on the state database.  I have not received the result for the rib x-ray, did they complete this yesterday?

## 2018-02-06 NOTE — PROGRESS NOTES
Subjective:     Chief Complaint   Patient presents with   • Fall     Arminda Cowart is a 89 y.o. female established patient of Dr. Cordero here for evaluation after fall. 3 days ago when walking into her building from the parking lot she fell hitting her R lateral ribs and the back of her head. She is sore along the lateral and posterior rib cage with movement. She has history of osteoporosis with spinal compression fractures. Pain is tolerable, She is occasionally using aleve which is helping.  She denies loss of consciousness, headache, difficulty breathing. She is not on any blood thinners.       Current medicines (including changes today)  Current Outpatient Prescriptions   Medication Sig Dispense Refill   • Naproxen Sodium (ALEVE PO) Take  by mouth.     • Cholecalciferol 2000 UNIT Cap Take 1 Cap by mouth every day. 90 Cap 3   • vitamin D (CHOLECALCIFEROL) 1000 UNIT Tab Take 4 Tabs by mouth every day. 30 Tab 0   • lidocaine (LIDODERM) 5 % Patch Apply 1 patch to affected area 12 hours on during the day and off at night 90 Patch 1   • Calcium 150 MG TABS Take  by mouth.     • Multiple Vitamin (MULTI-VITAMIN DAILY PO) Take  by mouth.       No current facility-administered medications for this visit.      She  has a past medical history of Anesthesia; Arthritis; Dementia without behavioral disturbance (2/18/2016); Dyslipidemia (7/22/2009); GERD (gastroesophageal reflux disease); Heart burn; Hiatus hernia syndrome; Humerus fracture (7/22/2009); Indigestion; Low back pain (7/22/2009); Osteopenia (7/22/2009); Osteoporosis; Pain; Preventative health care (7/22/2009); Shingles (5/1/2012); and Shoulder pain (7/22/2009). She also has no past medical history of Angina; Arrhythmia; ASTHMA; Bronchitis; Cancer (CMS-[facility]); CATARACT; Congestive heart failure (CMS-[facility]); COPD; Diabetes; Dialysis; Glaucoma; Heart murmur; Heart valve disease; Hypertension; Jaundice; Myocardial infarct; Other specified symptom associated with  "female genital organs; Pacemaker; Personal history of venous thrombosis and embolism; Pneumonia; Renal disorder; Rheumatic fever; Seizure (CMS-HCC); Stroke (CMS-HCC); Unspecified disorder of thyroid; Unspecified hemorrhagic conditions; or Unspecified urinary incontinence.    ROS included above     Objective:     Blood pressure 140/64, pulse 80, temperature 37 °C (98.6 °F), resp. rate 16, height 1.422 m (4' 8\"), weight 43.4 kg (95 lb 9.6 oz), SpO2 93 %, not currently breastfeeding. Body mass index is 21.43 kg/m².     Physical Exam:  General: Alert, oriented in no acute distress.  Eye contact is good, speech is normal, affect calm  HEENT: Perrl, EOMI, ecchymosis on the R forehead. No swelling, ecchymosis, or abnormality over the scalp. No point tenderness. \  Lungs: clear to auscultation bilaterally, normal effort, no wheeze/ rhonchi/ rales.  CV: regular rate and rhythm, S1, S2, no murmur. Right lateral lower ribs with mild ecchymosis wrapping around toward the posterior chest wall. Mildly tender, no obvious deformity  Abdomen: soft, nontender  Ext: no edema, color normal, vascularity normal, temperature normal    Assessment and Plan:   The following treatment plan was discussed   1. Chest wall pain  R lateral and posterior chest wall pain after fall. Will check xray. No difficulty breathing. Encouraged consistent use of walker which she has at home. May continue aleve as needed  [id number] (W/O CXR) RIGHT   2. Fall from ground level         Followup: pending xray         Please note that this dictation was created using voice recognition software. I have worked with consultants from the vendor as well as technical experts from [facility] to optimize the interface. I have made every reasonable attempt to correct obvious errors, but I expect that there are errors of grammar and possibly content that I did not discover before finalizing the note.       "

## 2018-02-06 NOTE — TELEPHONE ENCOUNTER
----- Message from KG Watkins sent at 2/5/2018  4:25 PM PST -----  Can you please check web IZ or call [facility] to see if there is record of pneumococcal vaccine

## 2018-02-06 NOTE — TELEPHONE ENCOUNTER
Phone Number Called: [phone] [facility]     Message: Called [facility] - all they have on file is a  high dose flu on file from last year. Web IZ does not have any record of pneumococcal vaccine either.

## 2018-02-06 NOTE — TELEPHONE ENCOUNTER
Satish stopped by the office - gave him the message below in person. He verbalized understanding. Pt has not had the x-ray completed yet as it was an hour and a half wait. Satish states he will take his mother to get the x-ray soon and will let us know once complete. Satish left updated contact information [phone].

## 2018-10-10 ENCOUNTER — HOSPITAL ENCOUNTER (OUTPATIENT)
Dept: LAB | Facility: MEDICAL CENTER | Age: 83
End: 2018-10-10
Attending: INTERNAL MEDICINE
Payer: MEDICARE

## 2018-10-10 DIAGNOSIS — F03.90 DEMENTIA WITHOUT BEHAVIORAL DISTURBANCE, UNSPECIFIED DEMENTIA TYPE: Chronic | ICD-10-CM

## 2018-10-10 DIAGNOSIS — R79.89 LOW VITAMIN D LEVEL: ICD-10-CM

## 2018-10-10 DIAGNOSIS — D50.0 IRON DEFICIENCY ANEMIA DUE TO CHRONIC BLOOD LOSS: Chronic | ICD-10-CM

## 2018-10-10 LAB
25(OH)D3 SERPL-MCNC: 21 NG/ML (ref 30–100)
ALBUMIN SERPL BCP-MCNC: 4.1 G/DL (ref 3.2–4.9)
ALBUMIN/GLOB SERPL: 1.5 G/DL
ALP SERPL-CCNC: 65 U/L (ref 30–99)
ALT SERPL-CCNC: 9 U/L (ref 2–50)
ANION GAP SERPL CALC-SCNC: 8 MMOL/L (ref 0–11.9)
AST SERPL-CCNC: 17 U/L (ref 12–45)
BASOPHILS # BLD AUTO: 0.4 % (ref 0–1.8)
BASOPHILS # BLD: 0.02 K/UL (ref 0–0.12)
BILIRUB SERPL-MCNC: 0.5 MG/DL (ref 0.1–1.5)
BUN SERPL-MCNC: 18 MG/DL (ref 8–22)
CALCIUM SERPL-MCNC: 9.5 MG/DL (ref 8.5–10.5)
CHLORIDE SERPL-SCNC: 105 MMOL/L (ref 96–112)
CO2 SERPL-SCNC: 26 MMOL/L (ref 20–33)
COMMENT 1642: NORMAL
CREAT SERPL-MCNC: 0.75 MG/DL (ref 0.5–1.4)
EOSINOPHIL # BLD AUTO: 0.06 K/UL (ref 0–0.51)
EOSINOPHIL NFR BLD: 1.1 % (ref 0–6.9)
ERYTHROCYTE [DISTWIDTH] IN BLOOD BY AUTOMATED COUNT: 53.1 FL (ref 35.9–50)
FASTING STATUS PATIENT QL REPORTED: NORMAL
GLOBULIN SER CALC-MCNC: 2.7 G/DL (ref 1.9–3.5)
GLUCOSE SERPL-MCNC: 70 MG/DL (ref 65–99)
HCT VFR BLD AUTO: 36.4 % (ref 37–47)
HGB BLD-MCNC: 9.9 G/DL (ref 12–16)
IMM GRANULOCYTES # BLD AUTO: 0.01 K/UL (ref 0–0.11)
IMM GRANULOCYTES NFR BLD AUTO: 0.2 % (ref 0–0.9)
LG PLATELETS BLD QL SMEAR: NORMAL
LYMPHOCYTES # BLD AUTO: 1.12 K/UL (ref 1–4.8)
LYMPHOCYTES NFR BLD: 21.3 % (ref 22–41)
MCH RBC QN AUTO: 21.2 PG (ref 27–33)
MCHC RBC AUTO-ENTMCNC: 27.2 G/DL (ref 33.6–35)
MCV RBC AUTO: 78.1 FL (ref 81.4–97.8)
MONOCYTES # BLD AUTO: 0.39 K/UL (ref 0–0.85)
MONOCYTES NFR BLD AUTO: 7.4 % (ref 0–13.4)
MORPHOLOGY BLD-IMP: NORMAL
NEUTROPHILS # BLD AUTO: 3.65 K/UL (ref 2–7.15)
NEUTROPHILS NFR BLD: 69.6 % (ref 44–72)
NRBC # BLD AUTO: 0 K/UL
NRBC BLD-RTO: 0 /100 WBC
PLATELET # BLD AUTO: 268 K/UL (ref 164–446)
PLATELET BLD QL SMEAR: NORMAL
PMV BLD AUTO: 8.9 FL (ref 9–12.9)
POTASSIUM SERPL-SCNC: 3.6 MMOL/L (ref 3.6–5.5)
PROT SERPL-MCNC: 6.8 G/DL (ref 6–8.2)
RBC # BLD AUTO: 4.66 M/UL (ref 4.2–5.4)
RBC BLD AUTO: PRESENT
SODIUM SERPL-SCNC: 139 MMOL/L (ref 135–145)
TSH SERPL DL<=0.005 MIU/L-ACNC: 2.36 UIU/ML (ref 0.38–5.33)
WBC # BLD AUTO: 5.3 K/UL (ref 4.8–10.8)

## 2018-10-10 PROCEDURE — 36415 COLL VENOUS BLD VENIPUNCTURE: CPT

## 2018-10-10 PROCEDURE — 84443 ASSAY THYROID STIM HORMONE: CPT

## 2018-10-10 PROCEDURE — 82306 VITAMIN D 25 HYDROXY: CPT | Mod: GA

## 2018-10-10 PROCEDURE — 80053 COMPREHEN METABOLIC PANEL: CPT

## 2018-10-10 PROCEDURE — 85025 COMPLETE CBC W/AUTO DIFF WBC: CPT

## 2018-10-11 ENCOUNTER — TELEPHONE (OUTPATIENT)
Dept: MEDICAL GROUP | Facility: MEDICAL CENTER | Age: 83
End: 2018-10-11

## 2018-10-11 DIAGNOSIS — Z00.00 PREVENTATIVE HEALTH CARE: Chronic | ICD-10-CM

## 2018-10-11 DIAGNOSIS — D50.0 IRON DEFICIENCY ANEMIA DUE TO CHRONIC BLOOD LOSS: Chronic | ICD-10-CM

## 2018-10-11 DIAGNOSIS — M85.80 OSTEOPENIA, UNSPECIFIED LOCATION: Chronic | ICD-10-CM

## 2018-10-12 ENCOUNTER — TELEPHONE (OUTPATIENT)
Dept: MEDICAL GROUP | Facility: MEDICAL CENTER | Age: 83
End: 2018-10-12

## 2018-10-12 DIAGNOSIS — D50.9 IRON DEFICIENCY ANEMIA, UNSPECIFIED IRON DEFICIENCY ANEMIA TYPE: ICD-10-CM

## 2018-10-12 NOTE — TELEPHONE ENCOUNTER
1. Caller Name: Satish                      Call Back Number: [phone]    2. Message: Pt's son (Satish) called this am, Returning your call from last night. Please call him on his cell: [phone].     3. Patient approves office to leave a detailed voicemail/MyChart message: no

## 2018-10-13 NOTE — TELEPHONE ENCOUNTER
Spoke to son, patient's anemia has recurred likely iron deficient, patient with moderate to worsening dementia, at assisted [facility].  No further invasive testing given age and dementia, we agree, we will try oral iron supplementation as best possible given her age and dementia son states that she sometimes has difficulty taking pills, will try liquid formulation if possible.  Order faxed to assisted living.  Son understands and agrees.

## 2018-12-19 ENCOUNTER — NON-PROVIDER VISIT (OUTPATIENT)
Dept: MEDICAL GROUP | Facility: MEDICAL CENTER | Age: 83
End: 2018-12-19
Payer: MEDICARE

## 2018-12-19 DIAGNOSIS — Z23 NEED FOR VACCINATION: ICD-10-CM

## 2018-12-19 PROCEDURE — G0008 ADMIN INFLUENZA VIRUS VAC: HCPCS | Performed by: NURSE PRACTITIONER

## 2018-12-19 PROCEDURE — 90662 IIV NO PRSV INCREASED AG IM: CPT | Performed by: NURSE PRACTITIONER

## 2018-12-19 NOTE — NON-PROVIDER
"Arminda Cowart is a 89 y.o. female here for a non-provider visit for:   FLU    Reason for immunization: Annual Flu Vaccine  Immunization records indicate need for vaccine: Yes, confirmed with Epic  Minimum interval has been met for this vaccine: Yes  ABN completed: Not Indicated    Order and dose verified by: aed  VIS Dated  8-7-2015 was given to patient: Yes  All IAC Questionnaire questions were answered \"No.\"    Patient tolerated injection and no adverse effects were observed or reported: Yes    Pt scheduled for next dose in series: Not Indicated    "

## 2018-12-23 ENCOUNTER — TELEPHONE (OUTPATIENT)
Dept: MEDICAL GROUP | Facility: MEDICAL CENTER | Age: 83
End: 2018-12-23

## 2018-12-23 DIAGNOSIS — D50.0 IRON DEFICIENCY ANEMIA DUE TO CHRONIC BLOOD LOSS: Chronic | ICD-10-CM

## 2018-12-23 DIAGNOSIS — Z91.81 AT HIGH RISK FOR INJURY RELATED TO FALL: ICD-10-CM

## 2018-12-23 DIAGNOSIS — Z87.81 HISTORY OF HUMERUS FRACTURE: ICD-10-CM

## 2018-12-23 DIAGNOSIS — Z87.81 HISTORY OF COMPRESSION FRACTURE OF SPINE: Chronic | ICD-10-CM

## 2018-12-23 DIAGNOSIS — F03.90 DEMENTIA WITHOUT BEHAVIORAL DISTURBANCE, UNSPECIFIED DEMENTIA TYPE: Chronic | ICD-10-CM

## 2018-12-23 NOTE — TELEPHONE ENCOUNTER
See my chart message, note from daughter Hansa [phone]. Asking for bedside commode and raised toilet seat.  Last seen by Mariza in February.  I have not seen her in over a year.  Chronic low back pain, patient has mild memory loss, dementia, is homebound because of this, and is residing in assisted living [facility].  Will place referral for home health evaluation.

## 2018-12-26 ENCOUNTER — HOME HEALTH ADMISSION (OUTPATIENT)
Dept: HOME HEALTH SERVICES | Facility: HOME HEALTHCARE | Age: 83
End: 2018-12-26
Payer: MEDICARE

## 2018-12-26 ENCOUNTER — TELEPHONE (OUTPATIENT)
Dept: MEDICAL GROUP | Facility: MEDICAL CENTER | Age: 83
End: 2018-12-26

## 2018-12-27 ENCOUNTER — TELEPHONE (OUTPATIENT)
Dept: MEDICAL GROUP | Facility: MEDICAL CENTER | Age: 83
End: 2018-12-27

## 2018-12-27 NOTE — TELEPHONE ENCOUNTER
Please notify patient's daughter bradley [phone] that home health is unable to see the patient because she has not been seen by her primary care provider in over 6 months.      In order to be evaluated for a portable commode and a raised toilet seat, the patient will need to make appointment with 1 of us.

## 2018-12-27 NOTE — TELEPHONE ENCOUNTER
Celina @ [facility] called to notify does not contract with this pt's insurance. Please advise.

## 2018-12-27 NOTE — TELEPHONE ENCOUNTER
Hansa called and confirmed via VM that she was aware that pt needed appt. Has just not made one yet.

## 2019-02-14 ENCOUNTER — HOSPITAL ENCOUNTER (OUTPATIENT)
Facility: MEDICAL CENTER | Age: 84
End: 2019-02-14
Attending: INTERNAL MEDICINE
Payer: MEDICARE

## 2019-02-14 ENCOUNTER — OFFICE VISIT (OUTPATIENT)
Dept: MEDICAL GROUP | Facility: MEDICAL CENTER | Age: 84
End: 2019-02-14
Payer: MEDICARE

## 2019-02-14 VITALS
OXYGEN SATURATION: 97 % | HEART RATE: 60 BPM | TEMPERATURE: 96.6 F | HEIGHT: 56 IN | SYSTOLIC BLOOD PRESSURE: 100 MMHG | WEIGHT: 85 LBS | DIASTOLIC BLOOD PRESSURE: 70 MMHG | BODY MASS INDEX: 19.12 KG/M2

## 2019-02-14 DIAGNOSIS — F03.90 DEMENTIA WITHOUT BEHAVIORAL DISTURBANCE, UNSPECIFIED DEMENTIA TYPE: Chronic | ICD-10-CM

## 2019-02-14 DIAGNOSIS — R31.9 URINARY TRACT INFECTION WITH HEMATURIA, SITE UNSPECIFIED: ICD-10-CM

## 2019-02-14 DIAGNOSIS — N39.0 URINARY TRACT INFECTION WITH HEMATURIA, SITE UNSPECIFIED: ICD-10-CM

## 2019-02-14 DIAGNOSIS — Z91.81 AT HIGH RISK FOR INJURY RELATED TO FALL: ICD-10-CM

## 2019-02-14 LAB
APPEARANCE UR: NORMAL
BILIRUB UR STRIP-MCNC: NORMAL MG/DL
COLOR UR AUTO: YELLOW
GLUCOSE UR STRIP.AUTO-MCNC: NORMAL MG/DL
KETONES UR STRIP.AUTO-MCNC: NORMAL MG/DL
LEUKOCYTE ESTERASE UR QL STRIP.AUTO: NORMAL
NITRITE UR QL STRIP.AUTO: NORMAL
PH UR STRIP.AUTO: 6 [PH] (ref 5–8)
PROT UR QL STRIP: NORMAL MG/DL
RBC UR QL AUTO: NORMAL
SP GR UR STRIP.AUTO: 1.02
UROBILINOGEN UR STRIP-MCNC: 0.2 MG/DL

## 2019-02-14 PROCEDURE — 99214 OFFICE O/P EST MOD 30 MIN: CPT | Performed by: INTERNAL MEDICINE

## 2019-02-14 PROCEDURE — 81002 URINALYSIS NONAUTO W/O SCOPE: CPT | Performed by: INTERNAL MEDICINE

## 2019-02-14 PROCEDURE — 87086 URINE CULTURE/COLONY COUNT: CPT

## 2019-02-14 ASSESSMENT — PATIENT HEALTH QUESTIONNAIRE - PHQ9: CLINICAL INTERPRETATION OF PHQ2 SCORE: 0

## 2019-02-15 NOTE — PROGRESS NOTES
Subjective:      Arminda Cowart is a 89 y.o. female who presents dementia           HPI   Patient is here with daughter Hansa who has medical power of , patient lives at assisted [facility], daughter's telephone number [phone].  Here to complete forms for VA assistance.  Patient is ,  was in .  Patient with underlying dementia requires total care and supervision.  History obtained from patient who is not a good historian, history obtained from daughter as well as reviewing evaluation from assisted living.  Patient has occasional episodes of confusion, needs regular prompting due to confusion and disorientation, staff will reorient patient as needed.  Patient will wander at times but he is easily redirected, decreased short-term memory, no hallucinations or delusions.  Patient is able to communicate simple needs.  Needs daily assistance and supervision with consistent redirection and activities, short attention span.  Requires assistance with grooming and dressing on a daily basis, because of dementia, will forget that she needs to change, needs assistance with changing into close given dementia, decreased coordination, difficulty with fine hand movements and motor skills such as buttoning, unable to put on pants or tops because of difficulty with balance, coordination, weakness lower extremities, decreased  strength and fine motor abilities, limited range of motion right shoulder, chronic low back pain and limited back range of motion.  Patient needs reminders and assistance with oral hygiene secondary to dementia.  Patient needs assistance with grooming as well as toileting, bathing/showering, bed transfers, assistance and supervision going from sitting to standing.  Patient should be using a walker at all times but does not remember to at times use walker.  History of falls within the last 3 months.  Without walker has unsteady gait, decreased strength in legs, is  a fall risk.  Even with walker has difficulty ambulating long distance because of fatigue, lower extremity weakness, back pain, right shoulder and wrist pain after fractures, such that gripping the walker is difficult for long distances.  When needing to travel more than 10 feet has difficulty with walker.  Requires wheelchair for longer distances ambulating over 10-20 feet.  Needs supervision and standby assistance even with walker is a fall risk because of weakness, difficulty with balance and coordination from dementia, low back pain, right upper extremity weakness status post fractures.  Patient requires assist to bathroom and assistance with toileting, difficulty sitting on toilet and going from sitting to standing, difficulty with wiping and hygiene after bowel or bladder movement.  Fecal and urinary incontinence.  Needs assist removing clothing to use the toilet, and assistance with personal cleaning task, redressing, and use of fresh incontinence products.  Thus needs supervision for toileting.  Needs assistance with bathing, because of imbalance, needs to use a shower chair and somebody needs to be the patient.  Unable to remember to take medications on her own, all medications provided to her.  Needs prompt to remember to eat, secondary to confusion and memory loss.  Patient needs all meals prepared, regular diet, unable to use any cooking utensils or prepare food on her own because of dementia and confusion.  Patient unable to manage her own finances, does not possess the capacity for making her own decisions regarding medical, legal, or financial affairs, daughter has power of .  Unable to vacate the premises in case of emergency on her own without assistance.  Walker for ambulation does not have her own wheelchair  No tobacco, no alcohol    Current Outpatient Prescriptions   Medication Sig Dispense Refill   • ferrous sulfate (FEOSOL) 220 (44 Fe) MG/5ML Elixir Take 5 mL by mouth every day. 150 mL  6   • Naproxen Sodium (ALEVE PO) Take  by mouth.     • Cholecalciferol 2000 UNIT Cap Take 1 Cap by mouth every day. 90 Cap 3   • vitamin D (CHOLECALCIFEROL) 1000 UNIT Tab Take 4 Tabs by mouth every day. 30 Tab 0   • lidocaine (LIDODERM) 5 % Patch Apply 1 patch to affected area 12 hours on during the day and off at night 90 Patch 1   • Calcium 150 MG TABS Take  by mouth.     • Multiple Vitamin (MULTI-VITAMIN DAILY PO) Take  by mouth.       No current facility-administered medications for this visit.         Anemia  4/29/15 hgb 13,hct 41  2/19/16 hgb 7.0,hct 25,mcv 77,b12 36,folate >24,ESR 15, cmp, tsh normal  2/20/16 chest x-ray atelectasis, cardiomegaly  2/20/16 hospital admission, 2/21/16, hospital discharge  Diagnosis anemia, admitted with hemoglobin 6.7, transfuse one unit packed red blood cells, patient declined EGD and colonoscopy, recommend follow-up outpatient  2/20/16 hgb 6.7,hct 24,mcv 77,iron <10,%saturation not calculated due to iron <10,feritin 8.4  2/21/16 hgb 7.7,hct 26,mcv 80  3/3/16 hgb 8.8,hct 26  3/25/16 completed day 5/5 venofer iron infusion  5/5/16 hgb 12.6,hct 42,mcv 89,iron 46,%sat 18,ferritin 326  7/27/16 hgb 13.7,hct 44.7,iron 73,%sat 20,ferritin 67,b12 252,SPEP negative  12/21/17 hgb 13,hct 43  10/11/18 hgb 9.9,hct 36,mcv 78   10/12/18 start feosol solution 220 mg daily     Dementia  2/18/16 MMSE 20/30 labs and CT pending, if negative consider aricept  2/19/16 hgb 7.0,hct 25,mcv 77,b12 36,folate >24,ESR 15, cmp, tsh normal  7/26/16 start aricept 5 mg  8/16/16 increase to aricept 10 mg  8/14/17 not taking aricept nausea  9/28/17 alert to year, city of residence, self     Dyslipidemia  3/09 chol 196,trig 74,hdl 51,ldl 130  1/10 chol 229,trig 80,hdl 52,ldl 161  11/10 chol 235,trig 63,hdl 68,ldl 154 decline statin  8/11 chol 221,trig 63,hdl 57,ldl 151 decline statin  10/8/13 chol 204,trig 75,hdl 74,ldl 115   11/10/14 chol 208,trig 52,hdl 79,ldl 119     Gastroesophageal reflux  8/3/12 stop  prilosec due to osteopenia and vertebral compression fractures     History compression fracture  1/08 lumbar spine x-ray, which compression deformity T11-T12  5/6/14 x-ray lumbar spine superior endplate irregularity L5 consistent with mild compression deformity uncertain age, no other compression fractures moderate to severe arthrosis  5/6/14 physical therapy for low back pain, mild L5 superior endplate fracture, age unknown. Declines reclast or prolia  3/31/15 x-ray lumbar spine interval progression L5 superior endplate compression fracture with 30% height loss, chronic compression fractures of thoracic spine, DJD  4/1/15 consider tramadol for pain, calcitonin for pain from compression fracture after tramadol, ask her again about DEXA she has declined that and all treatment in the past hopefully pain can be controlled otherwise vertebral augmentation could be considered  4/27/15 dexa at [facility] LS -1.8,hip -1.8  4/28/15  orthopedic note, MRI lumbar spine reviewed moderate stenosis L4-L5, acute L5 fracture, we will proceed with kyphoplasty L5   4/29/15  kyphoplasty L5  4/29/15 xray lumbar spine, contrast material L5 consistent with kyphoplasty  5/12/15 dr.men pfeiffer ortho note; status post kyphoplasty L5, x-ray lumbar spine cement within L5 vertebral body, minimal leakage, still with pain from L4-L5 stenosis, consider epidural L4-L5, physical therapy  6/11/15 declines miacalcin  7/26/16 declines prolia and reclast     History humerus fracture  Sees dr.osgood  2/09 right humerus hemiarthroplasty rotator cuff repair      History shingles     Osteopenia  9/07 dexa LS -1.6,hip -1.4 intolerant of boniva,fosamax,evista,she needs to try reclast  1/10 vit d 34  2/10 dexa LS -1.8,hip -1.6  11/10 vit d 36   5/12 x-ray thoracic spine 2 chronic lower thoracic compression fractures T9 and T11, more superior compression fractures, resulting loss of height and kyphosis  5/9/12 I rec reclast, pt declines for  now  5/19/12 start fosamax q week, rec also use zantac instead of prilosec  10/8/13 not taking fosamax, vit d 41  3/29/14 xray right wrist minimally impacted fracture of the distal radius, with slight posterior angulation of the major distal fragments  5/6/14 x-ray thoracic spine severe kyphosis, severe compression fracture midthoracic vertebral body no change, moderate compression fracture 2 levels below without change. No new fractures are noted  5/6/14 x-ray lumbar spine superior endplate irregularity L5 consistent with mild compression deformity uncertain age, no other compression fractures moderate to severe arthrosis  5/6/14 declines reclast, prolia, dexa  11/6/14 declines bone density testing, declines reclast and prolia  11/10/14 vit d 38  3/31/15 x-ray lumbar spine interval progression L5 superior endplate compression fracture with 30% height loss, chronic compression fractures of thoracic spine, DJD  4/27/15 cbc,cmp,tsh normal, vit d 30, PTH 22.7  4/27/15 dexa at [facility] LS -1.8,hip -1.8  4/29/15  kyphoplasty L5   6/11/15 declines reclast or prolia  7/26/16 declines prolia or intravenous reclast  7/27/16 vit d 27, increase to 4000 units daily  12/21/17 vit d 27 start 2000 units of vit d daily  10/11/18 vit d 21 take 4000 units   intolerant of boniva,fosamax?,evista and declines reclast or prolia     Preventative health  2008 pneumovax  11/10/14 vit d 38  4/27/15 dexa at [facility] LS -1.8,hip -1.8  6/11/15 declines influenza vaccine, pneumococcal vaccine, shingles vaccine  7/26/16 written prevnar prescription provided to get at pharmacy  10/11/18 vit d 21 take 4000 units      Radial fracture  3/29/14 xray right wrist minimally impacted fracture of the distal radius, with slight posterior angulation of the major distal fragments  4/3/14  ortho note short armed splint  4/16/14  ortho note, cont splint  5/14/14  ortho note,cont splint follow up one month  6/4/14  ortho  "note               Patient Active Problem List   Diagnosis   • History of humerus fracture   • History of compression fracture of spine   • Osteopenia   • Dyslipidemia   • Preventative health care   • History of shingles   • GERD (gastroesophageal reflux disease)   • Radial fracture   • Dementia without behavioral disturbance   • Anemia   • Rib fracture   • At high risk for injury related to fall     Depression Screening    Little interest or pleasure in doing things?  0 - not at all  Feeling down, depressed , or hopeless? 0 - not at all  Patient Health Questionnaire Score: 0          Health Maintenance Summary                IMM ZOSTER VACCINES Overdue 11/14/1973     IMM PNEUMOCOCCAL 65+ (ADULT) LOW/MEDIUM RISK SERIES Overdue 11/14/1988     IMM DTaP/Tdap/Td Vaccine Overdue 8/29/2009      Done 8/28/2009 Imm Admin: TD Vaccine    Annual Wellness Visit Overdue 8/23/2018      Done 8/22/2017 Visit Dx: Medicare annual wellness visit, subsequent     Patient has more history with this topic...    BONE DENSITY Next Due 11/6/2019      Patient Declined 11/6/2014      Patient has more history with this topic...          Patient Care Team:  Franck Cordero M.D. as PCP - General    ROS       Objective:     /70 (BP Location: Right arm, Patient Position: Sitting)   Pulse 60   Temp 35.9 °C (96.6 °F) (Temporal)   Ht 1.422 m (4' 8\")   Wt 38.6 kg (85 lb)   SpO2 97%   BMI 19.06 kg/m²  20/200 right and left  Patient Not alert to year, month, date, day of week, oriented to building, not oriented to city or state, registration intact 3/3, immediate recall, recall after 5 minutes 0/3.  Appropriate response to simple questions and commands.  Repetitive in nature regarding questions.  No hallucinations, no delusions.  Physical Exam   Constitutional: No distress.   HENT:   Head: Normocephalic and atraumatic.   Eyes: Conjunctivae are normal. Left eye exhibits no discharge.   Neck: Neck supple. No JVD present.   Cardiovascular: " Normal rate and regular rhythm.    Pulmonary/Chest: Effort normal and breath sounds normal. No respiratory distress.   Abdominal: She exhibits no distension.   Musculoskeletal: She exhibits no edema.   Skin: Skin is warm. She is not diaphoretic. No erythema.   Psychiatric: She has a normal mood and affect.   Nursing note and vitals reviewed.  Thin appearing frail female sitting in wheelchair  Normal cervical range of motion.  Limited right shoulder flexion, extension, abduction 90 degrees.  Left shoulder range of motion normal.   strength decreased 4-/5 bilateral.  Back diminished flexion and extension, mild tenderness lumbar spine region.  Strength dorsiflexion and plantar flexion 4/5 bilateral, knee flexion and extension 4-/5 bilateral.  Hip flexion and hip extension 4-/5 bilateral.  Able to stand with assist.  Unable to ambulate without walker or assist.   Fine motor movements impaired  Atrophy muscles lower extremities, no fasciculations  Limited right upper extremity strength and range of motion flexion, extension, abduction, internal and external rotation    MMSE 16    Vision 20/200 right and left     Assessment/Plan:       Assessment  #1 dementia severe significant impact memory loss, not oriented to place or time, unable to make judgments or solve problems unable to function independently, requires full care with dressing, hygiene, personal effects, requires significant prompting for all ADLs, unable to dress without assistance at all, unable to bathe without assistance at all, unable to toilet without assistance at all, needs all meals prepared, incontinent of urine and bowel frequently, patient is able to communicate, able to ambulate with walker with supervision, has fallen previously, requires significant prompting secondary to confusion to eat, needs help with bathing, toileting, hygiene needs, needs assistance with medication management and all medications provided for her, unable to care for all  her own financial or legal matters, daughter has power of     #2 gait instability related to dementia severe in nature, as well as chronic back pain related to previous vertebral compression fracture, has seen physical therapy, has fallen previously, forgets to use her walker because of dementia.  Even with walker because of lower extremity weakness, previous compression fracture, chronic back pain, she is unable to ambulate long distances even with using her walker as ambulating distances greater than 10-20 feet is not possible without prolonged resting.  Thus she needs to have a wheelchair for longer distances, and the wheelchair needs to be light weight secondary to difficulty with self propulsion because of decreased arm strength, arm pain related to previous shoulder and wrist fracture    #3 history of falls related to weakness, debilitation, difficulty with balance related to dementia    #4 previous history of right shoulder fracture and right wrist fracture limiting right upper extremity strength and range of motion, making it difficult for her to use walker for long distances    #5 occasional dysuria    #6 history of previous lumbar compression fracture with chronic low back pain    #7 history of previous right humerus and radial fracture secondary to fall, resultant limited range of motion and strength right upper extremity    Plan  #1 VA forms completed with help with daughter    #2 reviewed assisted living records    #3 completed assisted living forms    #4 patient would benefit from light weight wheelchair, has a mobility limitation preventing her from toileting, dressing, grooming, feeding, that mobility limitation is severe dementia, patient cannot walk independently without walker for short distances, cannot walk more than 10-20 feet with walker because of pain, weakness of lower extremities and wheelchair would improve the patient's ability to complete the mobility related activities of daily  living, unable to self propel because of arm weakness    #5 patient has a caregiver who will assist with wheelchair    #6 patient needs light weight wheelchair because patient cannot propel herself in standard wheelchair    #7 patient also requires adjustable commode on toilet, standard raised toilet seat is inadequate, has been tried, patient has difficulty even with a raised toilet seat because of weakness of lower extremities and balance issues, and adjustable commode that fits on the toilet with bars attached to the floor provides more stability for her, patient is already fallen previously when attempting to transfer off of or on to the toilet seat even with assist because of weakness, debilitation, confusion, back pain, upper extremity right shoulder pain    #8 UA    #9 lightweight wheelchair     #10 adjustable commode for toilet    #11 follow up 6 months

## 2019-02-17 LAB
BACTERIA UR CULT: NORMAL
SIGNIFICANT IND 70042: NORMAL
SITE SITE: NORMAL
SOURCE SOURCE: NORMAL

## 2019-02-18 ENCOUNTER — TELEPHONE (OUTPATIENT)
Dept: MEDICAL GROUP | Facility: MEDICAL CENTER | Age: 84
End: 2019-02-18

## 2019-02-18 NOTE — TELEPHONE ENCOUNTER
Please notify patient's daughter bradley [phone] that the urine test is negative for infection

## 2019-02-19 ENCOUNTER — TELEPHONE (OUTPATIENT)
Dept: MEDICAL GROUP | Facility: MEDICAL CENTER | Age: 84
End: 2019-02-19

## 2019-02-19 NOTE — TELEPHONE ENCOUNTER
----- Message from Franck Cordero M.D. sent at 2/18/2019  3:57 PM PST -----  Please notify patient's daughter bradley [phone] that the urine test is negative for infection

## 2019-03-25 ENCOUNTER — TELEPHONE (OUTPATIENT)
Dept: MEDICAL GROUP | Facility: MEDICAL CENTER | Age: 84
End: 2019-03-25

## 2019-03-25 NOTE — TELEPHONE ENCOUNTER
Hansa Joel (DT[facility])   Ph: [phone]    Pt's dtr (Hansa) would like BIMS Score mailed to her along with a copy of the forms you filled out for her on her 2/14 visit. Hansa is not in chart as contact, please advise.

## 2019-03-26 NOTE — TELEPHONE ENCOUNTER
Pt is at [facility], the comode was fro [employer]. They need the wheelchair to be small. She is 85#, 4 foot 10 maybe and it needs to have foot rests on it.

## 2019-05-16 DIAGNOSIS — D50.9 IRON DEFICIENCY ANEMIA, UNSPECIFIED IRON DEFICIENCY ANEMIA TYPE: ICD-10-CM

## 2019-06-21 ENCOUNTER — APPOINTMENT (OUTPATIENT)
Dept: RADIOLOGY | Facility: MEDICAL CENTER | Age: 84
DRG: 480 | End: 2019-06-21
Attending: ORTHOPAEDIC SURGERY
Payer: MEDICARE

## 2019-06-21 ENCOUNTER — APPOINTMENT (OUTPATIENT)
Dept: RADIOLOGY | Facility: MEDICAL CENTER | Age: 84
DRG: 480 | End: 2019-06-21
Attending: EMERGENCY MEDICINE
Payer: MEDICARE

## 2019-06-21 ENCOUNTER — HOSPITAL ENCOUNTER (INPATIENT)
Facility: MEDICAL CENTER | Age: 84
LOS: 3 days | DRG: 480 | End: 2019-06-24
Attending: EMERGENCY MEDICINE | Admitting: HOSPITALIST
Payer: MEDICARE

## 2019-06-21 ENCOUNTER — ANESTHESIA EVENT (OUTPATIENT)
Dept: SURGERY | Facility: MEDICAL CENTER | Age: 84
DRG: 480 | End: 2019-06-21
Payer: MEDICARE

## 2019-06-21 ENCOUNTER — ANESTHESIA (OUTPATIENT)
Dept: SURGERY | Facility: MEDICAL CENTER | Age: 84
DRG: 480 | End: 2019-06-21
Payer: MEDICARE

## 2019-06-21 DIAGNOSIS — S72.002A CLOSED FRACTURE DISLOCATION OF LEFT HIP JOINT, INITIAL ENCOUNTER (HCC): ICD-10-CM

## 2019-06-21 DIAGNOSIS — F03.90 DEMENTIA WITHOUT BEHAVIORAL DISTURBANCE, UNSPECIFIED DEMENTIA TYPE: ICD-10-CM
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
## 2019-06-21 DIAGNOSIS — D50.9 IRON DEFICIENCY ANEMIA, UNSPECIFIED IRON DEFICIENCY ANEMIA TYPE: ICD-10-CM

## 2019-06-21 PROBLEM — N39.0 ACUTE UTI: Status: ACTIVE | Noted: 2019-06-21

## 2019-06-21 PROBLEM — Z01.810 PREOPERATIVE CARDIOVASCULAR EXAMINATION: Status: ACTIVE | Noted: 2019-06-21

## 2019-06-21 PROBLEM — R73.09 ELEVATED GLUCOSE: Status: ACTIVE | Noted: 2019-06-21

## 2019-06-21 LAB
ALBUMIN SERPL BCP-MCNC: 3.6 G/DL (ref 3.2–4.9)
ALBUMIN/GLOB SERPL: 1.4 G/DL
ALP SERPL-CCNC: 50 U/L (ref 30–99)
ALT SERPL-CCNC: 15 U/L (ref 2–50)
ANION GAP SERPL CALC-SCNC: 6 MMOL/L (ref 0–11.9)
ANION GAP SERPL CALC-SCNC: 8 MMOL/L (ref 0–11.9)
APPEARANCE UR: ABNORMAL
APTT PPP: 26.6 SEC (ref 24.7–36)
AST SERPL-CCNC: 26 U/L (ref 12–45)
BACTERIA #/AREA URNS HPF: ABNORMAL /HPF
BASOPHILS # BLD AUTO: 0.2 % (ref 0–1.8)
BASOPHILS # BLD: 0.03 K/UL (ref 0–0.12)
BILIRUB SERPL-MCNC: 0.7 MG/DL (ref 0.1–1.5)
BILIRUB UR QL STRIP.AUTO: NEGATIVE
BUN SERPL-MCNC: 16 MG/DL (ref 8–22)
BUN SERPL-MCNC: 17 MG/DL (ref 8–22)
CALCIUM SERPL-MCNC: 8.1 MG/DL (ref 8.4–10.2)
CALCIUM SERPL-MCNC: 9 MG/DL (ref 8.4–10.2)
CHLORIDE SERPL-SCNC: 103 MMOL/L (ref 96–112)
CHLORIDE SERPL-SCNC: 104 MMOL/L (ref 96–112)
CO2 SERPL-SCNC: 27 MMOL/L (ref 20–33)
CO2 SERPL-SCNC: 27 MMOL/L (ref 20–33)
COLOR UR: YELLOW
CREAT SERPL-MCNC: 0.58 MG/DL (ref 0.5–1.4)
CREAT SERPL-MCNC: 0.71 MG/DL (ref 0.5–1.4)
EKG IMPRESSION: NORMAL
EOSINOPHIL # BLD AUTO: 0.07 K/UL (ref 0–0.51)
EOSINOPHIL NFR BLD: 0.5 % (ref 0–6.9)
EPI CELLS #/AREA URNS HPF: ABNORMAL /HPF
ERYTHROCYTE [DISTWIDTH] IN BLOOD BY AUTOMATED COUNT: 43.6 FL (ref 35.9–50)
GLOBULIN SER CALC-MCNC: 2.6 G/DL (ref 1.9–3.5)
GLUCOSE SERPL-MCNC: 128 MG/DL (ref 65–99)
GLUCOSE SERPL-MCNC: 162 MG/DL (ref 65–99)
GLUCOSE UR STRIP.AUTO-MCNC: NEGATIVE MG/DL
HCT VFR BLD AUTO: 43.1 % (ref 37–47)
HGB BLD-MCNC: 13.4 G/DL (ref 12–16)
IMM GRANULOCYTES # BLD AUTO: 0.05 K/UL (ref 0–0.11)
IMM GRANULOCYTES NFR BLD AUTO: 0.4 % (ref 0–0.9)
INR PPP: 1.11 (ref 0.87–1.13)
KETONES UR STRIP.AUTO-MCNC: ABNORMAL MG/DL
LEUKOCYTE ESTERASE UR QL STRIP.AUTO: ABNORMAL
LYMPHOCYTES # BLD AUTO: 1.4 K/UL (ref 1–4.8)
LYMPHOCYTES NFR BLD: 10.2 % (ref 22–41)
MCH RBC QN AUTO: 30.7 PG (ref 27–33)
MCHC RBC AUTO-ENTMCNC: 31.1 G/DL (ref 33.6–35)
MCV RBC AUTO: 98.9 FL (ref 81.4–97.8)
MICRO URNS: ABNORMAL
MONOCYTES # BLD AUTO: 0.61 K/UL (ref 0–0.85)
MONOCYTES NFR BLD AUTO: 4.4 % (ref 0–13.4)
MUCOUS THREADS #/AREA URNS HPF: ABNORMAL /HPF
NEUTROPHILS # BLD AUTO: 11.63 K/UL (ref 2–7.15)
NEUTROPHILS NFR BLD: 84.3 % (ref 44–72)
NITRITE UR QL STRIP.AUTO: NEGATIVE
NRBC # BLD AUTO: 0 K/UL
NRBC BLD-RTO: 0 /100 WBC
PH UR STRIP.AUTO: 5.5 [PH]
PLATELET # BLD AUTO: 201 K/UL (ref 164–446)
PMV BLD AUTO: 8.7 FL (ref 9–12.9)
POTASSIUM SERPL-SCNC: 4 MMOL/L (ref 3.6–5.5)
POTASSIUM SERPL-SCNC: 4.3 MMOL/L (ref 3.6–5.5)
PROT SERPL-MCNC: 6.2 G/DL (ref 6–8.2)
PROT UR QL STRIP: NEGATIVE MG/DL
PROTHROMBIN TIME: 14.5 SEC (ref 12–14.6)
RBC # BLD AUTO: 4.36 M/UL (ref 4.2–5.4)
RBC # URNS HPF: ABNORMAL /HPF
RBC UR QL AUTO: ABNORMAL
SODIUM SERPL-SCNC: 137 MMOL/L (ref 135–145)
SODIUM SERPL-SCNC: 138 MMOL/L (ref 135–145)
SP GR UR STRIP.AUTO: 1.01
WBC # BLD AUTO: 13.8 K/UL (ref 4.8–10.8)
WBC #/AREA URNS HPF: ABNORMAL /HPF

## 2019-06-21 PROCEDURE — 160029 HCHG SURGERY MINUTES - 1ST 30 MINS LEVEL 4: Performed by: ORTHOPAEDIC SURGERY

## 2019-06-21 PROCEDURE — 71045 X-RAY EXAM CHEST 1 VIEW: CPT

## 2019-06-21 PROCEDURE — A9270 NON-COVERED ITEM OR SERVICE: HCPCS | Performed by: ORTHOPAEDIC SURGERY

## 2019-06-21 PROCEDURE — 700105 HCHG RX REV CODE 258: Performed by: HOSPITALIST

## 2019-06-21 PROCEDURE — 87186 SC STD MICRODIL/AGAR DIL: CPT

## 2019-06-21 PROCEDURE — 700105 HCHG RX REV CODE 258: Performed by: EMERGENCY MEDICINE

## 2019-06-21 PROCEDURE — 160002 HCHG RECOVERY MINUTES (STAT): Performed by: ORTHOPAEDIC SURGERY

## 2019-06-21 PROCEDURE — 160041 HCHG SURGERY MINUTES - EA ADDL 1 MIN LEVEL 4: Performed by: ORTHOPAEDIC SURGERY

## 2019-06-21 PROCEDURE — 700111 HCHG RX REV CODE 636 W/ 250 OVERRIDE (IP): Performed by: HOSPITALIST

## 2019-06-21 PROCEDURE — 87077 CULTURE AEROBIC IDENTIFY: CPT

## 2019-06-21 PROCEDURE — 73551 X-RAY EXAM OF FEMUR 1: CPT | Mod: LT

## 2019-06-21 PROCEDURE — C1769 GUIDE WIRE: HCPCS | Performed by: ORTHOPAEDIC SURGERY

## 2019-06-21 PROCEDURE — 85610 PROTHROMBIN TIME: CPT

## 2019-06-21 PROCEDURE — 80048 BASIC METABOLIC PNL TOTAL CA: CPT

## 2019-06-21 PROCEDURE — 501445 HCHG STAPLER, SKIN DISP: Performed by: ORTHOPAEDIC SURGERY

## 2019-06-21 PROCEDURE — 99285 EMERGENCY DEPT VISIT HI MDM: CPT

## 2019-06-21 PROCEDURE — 99223 1ST HOSP IP/OBS HIGH 75: CPT | Mod: AI | Performed by: HOSPITALIST

## 2019-06-21 PROCEDURE — 160036 HCHG PACU - EA ADDL 30 MINS PHASE I: Performed by: ORTHOPAEDIC SURGERY

## 2019-06-21 PROCEDURE — A6222 GAUZE <=16 IN NO W/SAL W/O B: HCPCS | Performed by: ORTHOPAEDIC SURGERY

## 2019-06-21 PROCEDURE — 93005 ELECTROCARDIOGRAM TRACING: CPT | Performed by: EMERGENCY MEDICINE

## 2019-06-21 PROCEDURE — 36415 COLL VENOUS BLD VENIPUNCTURE: CPT

## 2019-06-21 PROCEDURE — 501838 HCHG SUTURE GENERAL: Performed by: ORTHOPAEDIC SURGERY

## 2019-06-21 PROCEDURE — 81001 URINALYSIS AUTO W/SCOPE: CPT

## 2019-06-21 PROCEDURE — 700101 HCHG RX REV CODE 250: Performed by: STUDENT IN AN ORGANIZED HEALTH CARE EDUCATION/TRAINING PROGRAM

## 2019-06-21 PROCEDURE — 700105 HCHG RX REV CODE 258: Performed by: ORTHOPAEDIC SURGERY

## 2019-06-21 PROCEDURE — 160009 HCHG ANES TIME/MIN: Performed by: ORTHOPAEDIC SURGERY

## 2019-06-21 PROCEDURE — 85730 THROMBOPLASTIN TIME PARTIAL: CPT

## 2019-06-21 PROCEDURE — 700102 HCHG RX REV CODE 250 W/ 637 OVERRIDE(OP): Performed by: ORTHOPAEDIC SURGERY

## 2019-06-21 PROCEDURE — 700105 HCHG RX REV CODE 258: Performed by: STUDENT IN AN ORGANIZED HEALTH CARE EDUCATION/TRAINING PROGRAM

## 2019-06-21 PROCEDURE — 303105 HCHG CATHETER EXTRA

## 2019-06-21 PROCEDURE — 96376 TX/PRO/DX INJ SAME DRUG ADON: CPT

## 2019-06-21 PROCEDURE — 160048 HCHG OR STATISTICAL LEVEL 1-5: Performed by: ORTHOPAEDIC SURGERY

## 2019-06-21 PROCEDURE — 0QS636Z REPOSITION RIGHT UPPER FEMUR WITH INTRAMEDULLARY INTERNAL FIXATION DEVICE, PERCUTANEOUS APPROACH: ICD-10-PCS | Performed by: ORTHOPAEDIC SURGERY

## 2019-06-21 PROCEDURE — 51702 INSERT TEMP BLADDER CATH: CPT

## 2019-06-21 PROCEDURE — 160035 HCHG PACU - 1ST 60 MINS PHASE I: Performed by: ORTHOPAEDIC SURGERY

## 2019-06-21 PROCEDURE — 700111 HCHG RX REV CODE 636 W/ 250 OVERRIDE (IP): Performed by: STUDENT IN AN ORGANIZED HEALTH CARE EDUCATION/TRAINING PROGRAM

## 2019-06-21 PROCEDURE — A4450 NON-WATERPROOF TAPE: HCPCS | Performed by: ORTHOPAEDIC SURGERY

## 2019-06-21 PROCEDURE — 304561 HCHG STAT O2

## 2019-06-21 PROCEDURE — 96375 TX/PRO/DX INJ NEW DRUG ADDON: CPT

## 2019-06-21 PROCEDURE — 700111 HCHG RX REV CODE 636 W/ 250 OVERRIDE (IP): Performed by: ORTHOPAEDIC SURGERY

## 2019-06-21 PROCEDURE — 770006 HCHG ROOM/CARE - MED/SURG/GYN SEMI*

## 2019-06-21 PROCEDURE — 73501 X-RAY EXAM HIP UNI 1 VIEW: CPT | Mod: LT

## 2019-06-21 PROCEDURE — C1713 ANCHOR/SCREW BN/BN,TIS/BN: HCPCS | Performed by: ORTHOPAEDIC SURGERY

## 2019-06-21 PROCEDURE — 80053 COMPREHEN METABOLIC PANEL: CPT

## 2019-06-21 PROCEDURE — 85025 COMPLETE CBC W/AUTO DIFF WBC: CPT

## 2019-06-21 PROCEDURE — 87086 URINE CULTURE/COLONY COUNT: CPT

## 2019-06-21 PROCEDURE — 110371 HCHG SHELL REV 272: Performed by: ORTHOPAEDIC SURGERY

## 2019-06-21 PROCEDURE — 700111 HCHG RX REV CODE 636 W/ 250 OVERRIDE (IP): Performed by: EMERGENCY MEDICINE

## 2019-06-21 PROCEDURE — 96374 THER/PROPH/DIAG INJ IV PUSH: CPT

## 2019-06-21 DEVICE — IMPLANTABLE DEVICE: Type: IMPLANTABLE DEVICE | Site: HIP | Status: FUNCTIONAL

## 2019-06-21 RX ORDER — HALOPERIDOL 5 MG/ML
1 INJECTION INTRAMUSCULAR
Status: DISCONTINUED | OUTPATIENT
Start: 2019-06-21 | End: 2019-06-21 | Stop reason: HOSPADM

## 2019-06-21 RX ORDER — HALOPERIDOL 5 MG/ML
1 INJECTION INTRAMUSCULAR EVERY 6 HOURS PRN
Status: DISCONTINUED | OUTPATIENT
Start: 2019-06-21 | End: 2019-06-24 | Stop reason: HOSPADM

## 2019-06-21 RX ORDER — SCOLOPAMINE TRANSDERMAL SYSTEM 1 MG/1
1 PATCH, EXTENDED RELEASE TRANSDERMAL
Status: DISCONTINUED | OUTPATIENT
Start: 2019-06-21 | End: 2019-06-24 | Stop reason: HOSPADM

## 2019-06-21 RX ORDER — ONDANSETRON 2 MG/ML
4 INJECTION INTRAMUSCULAR; INTRAVENOUS ONCE
Status: COMPLETED | OUTPATIENT
Start: 2019-06-21 | End: 2019-06-21

## 2019-06-21 RX ORDER — SODIUM CHLORIDE, SODIUM LACTATE, POTASSIUM CHLORIDE, CALCIUM CHLORIDE 600; 310; 30; 20 MG/100ML; MG/100ML; MG/100ML; MG/100ML
INJECTION, SOLUTION INTRAVENOUS CONTINUOUS
Status: DISCONTINUED | OUTPATIENT
Start: 2019-06-21 | End: 2019-06-21 | Stop reason: HOSPADM

## 2019-06-21 RX ORDER — ONDANSETRON 2 MG/ML
4 INJECTION INTRAMUSCULAR; INTRAVENOUS EVERY 4 HOURS PRN
Status: DISCONTINUED | OUTPATIENT
Start: 2019-06-21 | End: 2019-06-24 | Stop reason: HOSPADM

## 2019-06-21 RX ORDER — DOCUSATE SODIUM 100 MG/1
100 CAPSULE, LIQUID FILLED ORAL 2 TIMES DAILY
Status: DISCONTINUED | OUTPATIENT
Start: 2019-06-21 | End: 2019-06-24 | Stop reason: HOSPADM

## 2019-06-21 RX ORDER — OXYCODONE HCL 5 MG/5 ML
10 SOLUTION, ORAL ORAL
Status: DISCONTINUED | OUTPATIENT
Start: 2019-06-21 | End: 2019-06-21 | Stop reason: HOSPADM

## 2019-06-21 RX ORDER — MORPHINE SULFATE 4 MG/ML
4 INJECTION, SOLUTION INTRAMUSCULAR; INTRAVENOUS ONCE
Status: COMPLETED | OUTPATIENT
Start: 2019-06-21 | End: 2019-06-21

## 2019-06-21 RX ORDER — ACETAMINOPHEN 500 MG
1000 TABLET ORAL EVERY 6 HOURS
Status: DISCONTINUED | OUTPATIENT
Start: 2019-06-21 | End: 2019-06-24 | Stop reason: HOSPADM

## 2019-06-21 RX ORDER — MORPHINE SULFATE 4 MG/ML
2 INJECTION, SOLUTION INTRAMUSCULAR; INTRAVENOUS ONCE
Status: COMPLETED | OUTPATIENT
Start: 2019-06-21 | End: 2019-06-21

## 2019-06-21 RX ORDER — SODIUM CHLORIDE 9 MG/ML
500 INJECTION, SOLUTION INTRAVENOUS ONCE
Status: COMPLETED | OUTPATIENT
Start: 2019-06-21 | End: 2019-06-21

## 2019-06-21 RX ORDER — AMOXICILLIN 250 MG
1 CAPSULE ORAL NIGHTLY
Status: DISCONTINUED | OUTPATIENT
Start: 2019-06-21 | End: 2019-06-24 | Stop reason: HOSPADM

## 2019-06-21 RX ORDER — SODIUM CHLORIDE 9 MG/ML
INJECTION, SOLUTION INTRAVENOUS CONTINUOUS
Status: DISCONTINUED | OUTPATIENT
Start: 2019-06-21 | End: 2019-06-23

## 2019-06-21 RX ORDER — ONDANSETRON 2 MG/ML
4 INJECTION INTRAMUSCULAR; INTRAVENOUS
Status: DISCONTINUED | OUTPATIENT
Start: 2019-06-21 | End: 2019-06-21 | Stop reason: HOSPADM

## 2019-06-21 RX ORDER — AMOXICILLIN 250 MG
1 CAPSULE ORAL
Status: DISCONTINUED | OUTPATIENT
Start: 2019-06-21 | End: 2019-06-22

## 2019-06-21 RX ORDER — CEFAZOLIN SODIUM 1 G/3ML
INJECTION, POWDER, FOR SOLUTION INTRAMUSCULAR; INTRAVENOUS PRN
Status: DISCONTINUED | OUTPATIENT
Start: 2019-06-21 | End: 2019-06-21 | Stop reason: SURG

## 2019-06-21 RX ORDER — BISACODYL 10 MG
10 SUPPOSITORY, RECTAL RECTAL
Status: DISCONTINUED | OUTPATIENT
Start: 2019-06-21 | End: 2019-06-24 | Stop reason: HOSPADM

## 2019-06-21 RX ORDER — MORPHINE SULFATE 4 MG/ML
1 INJECTION, SOLUTION INTRAMUSCULAR; INTRAVENOUS EVERY 4 HOURS PRN
Status: DISCONTINUED | OUTPATIENT
Start: 2019-06-21 | End: 2019-06-22

## 2019-06-21 RX ORDER — SODIUM CHLORIDE, SODIUM LACTATE, POTASSIUM CHLORIDE, CALCIUM CHLORIDE 600; 310; 30; 20 MG/100ML; MG/100ML; MG/100ML; MG/100ML
INJECTION, SOLUTION INTRAVENOUS
Status: DISCONTINUED | OUTPATIENT
Start: 2019-06-21 | End: 2019-06-21 | Stop reason: SURG

## 2019-06-21 RX ORDER — KETOROLAC TROMETHAMINE 30 MG/ML
15 INJECTION, SOLUTION INTRAMUSCULAR; INTRAVENOUS EVERY 6 HOURS
Status: DISCONTINUED | OUTPATIENT
Start: 2019-06-21 | End: 2019-06-24 | Stop reason: HOSPADM

## 2019-06-21 RX ORDER — ENEMA 19; 7 G/133ML; G/133ML
1 ENEMA RECTAL
Status: DISCONTINUED | OUTPATIENT
Start: 2019-06-21 | End: 2019-06-24 | Stop reason: HOSPADM

## 2019-06-21 RX ORDER — OXYCODONE HCL 5 MG/5 ML
5 SOLUTION, ORAL ORAL
Status: DISCONTINUED | OUTPATIENT
Start: 2019-06-21 | End: 2019-06-21 | Stop reason: HOSPADM

## 2019-06-21 RX ORDER — DIPHENHYDRAMINE HYDROCHLORIDE 50 MG/ML
25 INJECTION INTRAMUSCULAR; INTRAVENOUS EVERY 6 HOURS PRN
Status: DISCONTINUED | OUTPATIENT
Start: 2019-06-21 | End: 2019-06-24 | Stop reason: HOSPADM

## 2019-06-21 RX ORDER — DEXAMETHASONE SODIUM PHOSPHATE 4 MG/ML
4 INJECTION, SOLUTION INTRA-ARTICULAR; INTRALESIONAL; INTRAMUSCULAR; INTRAVENOUS; SOFT TISSUE
Status: DISCONTINUED | OUTPATIENT
Start: 2019-06-21 | End: 2019-06-24 | Stop reason: HOSPADM

## 2019-06-21 RX ORDER — POLYETHYLENE GLYCOL 3350 17 G/17G
1 POWDER, FOR SOLUTION ORAL 2 TIMES DAILY PRN
Status: DISCONTINUED | OUTPATIENT
Start: 2019-06-21 | End: 2019-06-24 | Stop reason: HOSPADM

## 2019-06-21 RX ADMIN — CEFTRIAXONE 1 G: 1 INJECTION, POWDER, FOR SOLUTION INTRAMUSCULAR; INTRAVENOUS at 09:07

## 2019-06-21 RX ADMIN — LIDOCAINE HYDROCHLORIDE 60 MG: 20 INJECTION, SOLUTION INFILTRATION; PERINEURAL at 14:01

## 2019-06-21 RX ADMIN — MORPHINE SULFATE 2 MG: 4 INJECTION INTRAVENOUS at 03:42

## 2019-06-21 RX ADMIN — SODIUM CHLORIDE 500 ML: 900 INJECTION, SOLUTION INTRAVENOUS at 04:45

## 2019-06-21 RX ADMIN — ROCURONIUM BROMIDE 5 MG: 10 INJECTION INTRAVENOUS at 14:01

## 2019-06-21 RX ADMIN — EPHEDRINE SULFATE 5 MG: 50 INJECTION INTRAMUSCULAR; INTRAVENOUS; SUBCUTANEOUS at 14:17

## 2019-06-21 RX ADMIN — ROCURONIUM BROMIDE 5 MG: 10 INJECTION INTRAVENOUS at 14:33

## 2019-06-21 RX ADMIN — FENTANYL CITRATE 25 MCG: 50 INJECTION, SOLUTION INTRAMUSCULAR; INTRAVENOUS at 14:40

## 2019-06-21 RX ADMIN — CEFAZOLIN 1 G: 1 INJECTION, POWDER, FOR SOLUTION INTRAVENOUS at 14:03

## 2019-06-21 RX ADMIN — ONDANSETRON 4 MG: 2 INJECTION INTRAMUSCULAR; INTRAVENOUS at 02:45

## 2019-06-21 RX ADMIN — ROCURONIUM BROMIDE 5 MG: 10 INJECTION INTRAVENOUS at 14:19

## 2019-06-21 RX ADMIN — EPHEDRINE SULFATE 5 MG: 50 INJECTION INTRAMUSCULAR; INTRAVENOUS; SUBCUTANEOUS at 14:11

## 2019-06-21 RX ADMIN — PROPOFOL 50 MG: 10 INJECTION, EMULSION INTRAVENOUS at 14:01

## 2019-06-21 RX ADMIN — SUGAMMADEX 100 MG: 100 INJECTION, SOLUTION INTRAVENOUS at 15:16

## 2019-06-21 RX ADMIN — EPHEDRINE SULFATE 5 MG: 50 INJECTION INTRAMUSCULAR; INTRAVENOUS; SUBCUTANEOUS at 15:08

## 2019-06-21 RX ADMIN — FENTANYL CITRATE 25 MCG: 50 INJECTION, SOLUTION INTRAMUSCULAR; INTRAVENOUS at 14:16

## 2019-06-21 RX ADMIN — FENTANYL CITRATE 25 MCG: 50 INJECTION, SOLUTION INTRAMUSCULAR; INTRAVENOUS at 15:11

## 2019-06-21 RX ADMIN — ACETAMINOPHEN 1000 MG: 500 TABLET, FILM COATED ORAL at 23:11

## 2019-06-21 RX ADMIN — KETOROLAC TROMETHAMINE 15 MG: 30 INJECTION, SOLUTION INTRAMUSCULAR; INTRAVENOUS at 18:21

## 2019-06-21 RX ADMIN — FENTANYL CITRATE 25 MCG: 50 INJECTION, SOLUTION INTRAMUSCULAR; INTRAVENOUS at 14:51

## 2019-06-21 RX ADMIN — MORPHINE SULFATE 1 MG: 4 INJECTION INTRAVENOUS at 09:03

## 2019-06-21 RX ADMIN — FENTANYL CITRATE 25 MCG: 50 INJECTION, SOLUTION INTRAMUSCULAR; INTRAVENOUS at 14:01

## 2019-06-21 RX ADMIN — SODIUM CHLORIDE 1000 ML: 9 INJECTION, SOLUTION INTRAVENOUS at 02:43

## 2019-06-21 RX ADMIN — SODIUM CHLORIDE: 9 INJECTION, SOLUTION INTRAVENOUS at 10:02

## 2019-06-21 RX ADMIN — KETOROLAC TROMETHAMINE 15 MG: 30 INJECTION, SOLUTION INTRAMUSCULAR; INTRAVENOUS at 23:11

## 2019-06-21 RX ADMIN — MORPHINE SULFATE 2 MG: 4 INJECTION INTRAVENOUS at 02:45

## 2019-06-21 RX ADMIN — SODIUM CHLORIDE, POTASSIUM CHLORIDE, SODIUM LACTATE AND CALCIUM CHLORIDE: 600; 310; 30; 20 INJECTION, SOLUTION INTRAVENOUS at 13:55

## 2019-06-21 RX ADMIN — FENTANYL CITRATE 25 MCG: 50 INJECTION, SOLUTION INTRAMUSCULAR; INTRAVENOUS at 14:32

## 2019-06-21 RX ADMIN — EPHEDRINE SULFATE 5 MG: 50 INJECTION INTRAMUSCULAR; INTRAVENOUS; SUBCUTANEOUS at 14:27

## 2019-06-21 RX ADMIN — SODIUM CHLORIDE 2 G: 9 INJECTION, SOLUTION INTRAVENOUS at 21:05

## 2019-06-21 ASSESSMENT — PAIN SCALES - PAIN ASSESSMENT IN ADVANCED DEMENTIA (PAINAD)
BREATHING: OCCASIONAL LABORED BREATHING, SHORT PERIOD OF HYPERVENTILATION
BODYLANGUAGE: RELAXED
CONSOLABILITY: DISTRACTED OR REASSURED BY VOICE/TOUCH
CONSOLABILITY: NO NEED TO CONSOLE
FACIALEXPRESSION: SMILING OR INEXPRESSIVE
TOTALSCORE: 8
TOTALSCORE: 0
FACIALEXPRESSION: FACIAL GRIMACING
BREATHING: NORMAL
NEGVOCALIZATION: REPEATED TROUBLED CALLING OUT, LOUD MOANING/GROANING, CRYING
BODYLANGUAGE: RIGID, FISTS CLENCHED, KNEES UP, PUSHING/PULLING AWAY, STRIKES OUT

## 2019-06-21 ASSESSMENT — ENCOUNTER SYMPTOMS
SORE THROAT: 0
WEAKNESS: 0
DOUBLE VISION: 0
FEVER: 0
MYALGIAS: 1
DEPRESSION: 0
HEADACHES: 0
FALLS: 1
NAUSEA: 0
BLURRED VISION: 0
BRUISES/BLEEDS EASILY: 0
INSOMNIA: 0
DIZZINESS: 0
COUGH: 0
PALPITATIONS: 0
VOMITING: 0
NECK PAIN: 0
SHORTNESS OF BREATH: 0

## 2019-06-21 NOTE — ED NOTES
POLST was shown to ERP. Pt is off to x ray. X ray tech notified not to do x ray, tech speaking to MD at this time.

## 2019-06-21 NOTE — DIETARY
"Nutrition services: Day 0 of admit.  Arminda Cowart is a 89 y.o. female with admitting DX of Displaced fracture of base of neck of left femur.  History includes Dementia, Dyslipidemia, GERD, Osteoporosis.  Consult received for BMI <19    Assessment:  Height: 149.9 cm (4' 11\")  Weight: 39 kg (85 lb 15.7 oz)  Body mass index is 17.37 kg/m²., BMI classification: Underweight  Diet/Intake: NPO    Evaluation:   1. Pt with BMI <19.    2. Currently NPO pending possible surgery.  3. Noted pt DNR/DNI with limited interventions per POLST.    Malnutrition Risk: Pt at risk with BMI <19.  Unable to fully assess at this time.    Recommendations/Plan:  1. NPO as ordered.  Diet advancement post-op per MD.   2. Once able to advance diet, encourage intake of 50% of meals.  3. Document intake of all PO as % taken in ADL's to provide interdisciplinary communication across all shifts.   4. Monitor weight.  5. RD to monitor.            "

## 2019-06-21 NOTE — ASSESSMENT & PLAN NOTE
Family is aware of her risk  Today patient complains of chest pain but EKG reveals no ischemia, follow-up troponin will be obtained 6 hours after onset of chest pain

## 2019-06-21 NOTE — ASSESSMENT & PLAN NOTE
-Status post ground-level fall.  IM nail 6/21  Acute blood loss secondary to trauma versus surgery, moderate swelling in the leg however nothing overt

## 2019-06-21 NOTE — ED NOTES
Pablo placed with 2 RN's. Pt continues to complain of pain on her lower back and left hip. Pt moaning and crying. Pt was recently medicated. reorientation and reassurance provided.

## 2019-06-21 NOTE — OP REPORT
DATE OF SERVICE:  06/21/2019    SURGEON:  Devin Hayes MD    ANESTHESIOLOGIST:  Chris Pro MD    PREOPERATIVE DIAGNOSIS:  Left proximal femur fracture.    POSTOPERATIVE DIAGNOSIS:  Left basicervical femoral neck fracture.    OPERATIVE PROCEDURE:  IM nailing of left basicervical neck fracture.    PROCEDURE:  The patient was seen in the preop holding area and I had a very   lengthy discussion with the patient and family members, which was outlined in   the history and physical/consult.  The patient was skin scribed and brought to   the operating suite after completion of the dictated H and P and sign off on   the written H and P.  She was carefully positioned on the OR table after   general anesthetic and fluoroscopy was brought in prior to prep and drape and   the fracture visualized.  With adequate visualization, it was apparent that   the fracture was in truth a basicervical femoral neck and not an   intertrochanteric hip fracture and most certainly not an impacted femoral neck   fracture.  With slight traction, she reduced well and the lateral was   concerning in that she seemed to be very anteverted or flexed at the fracture   site.  Additional equipment was brought in for potential open reduction and   the prep and drape followed.  I initiated a placement of the proximal pin and   confirmed under biplane image before reaming and then passed without   difficulty.  The guide mike down to the knee region, which was visualized on AP   and lateral to assure proper position.  Measurement was made followed by   reaming and a 130-degree 10 mm nail was opened.  I then placed the nail under   hand control down the canal and confirmed appropriate length at the knee level   using fluoroscopy.  I then addressed the proximal issues and ended up   extending the incision between the proximal insertion and the femoral neck   reaming instruments and dissection to the anterior neck region.  The neck   itself was reduced,  but I found her to be in an extremely anteverted position   of almost 40 degrees.  I presume this is her anatomy, but it was not really a   fracture displacement as the flexed position reduced with minimal traction.    Again, under fluoroscopy as well as direct palpation, femoral neck was reduced   along the basicervical region.  I then cannulated the proximal femur and   confirmed under biplane image to be appropriately positioned and then placed 2   proximal screws and then confirmed them to be appropriately positioned.  The   screws were locked proximally.  The proximal jig was removed and final   radiographs again confirmed appropriate position of the proximal screw.  Due   to the fact this was a basicervical fracture and not an intertrochanteric   fracture, distal locking was not indicated.  The wound was heavily irrigated.    I used a #1 running Vicryl suture to double run the iliotibial band.  The   wound had been irrigated and additional Vicryl was followed by skin staples,   island dressing.    The patient tolerated the procedure well and I would estimate the blood loss   at 150 mL.  She did receive Ancef preop within an hour of surgery and this was   confirmed at the preop time-out.  No complications were known to have   occurred and I spoke to the family postop with regard to operative findings.    The patient will be able to progress up to 50 pounds weightbearing at this   point, but I would limit beyond that until her first postop visit, which   should be in 2 weeks.  Family is currently working on placement.  I will leave   the postoperative pain medication and DVT prophylaxis to the hospitalist   service with my appreciation.       ____________________________________     MD VILMA Partida / ETHAN    DD:  06/21/2019 15:26:59  DT:  06/21/2019 16:38:24    D#:  [id number]  Job#:  [id number]

## 2019-06-21 NOTE — CARE PLAN
Problem: Nutritional:  Goal: Achieve adequate nutritional intake  Advance diet as tolerated.  Once diet advanced, patient will consume 50% of meals  Outcome: NOT MET

## 2019-06-21 NOTE — ED NOTES
"Pt resting on gurney. Pt confused and stating \" I hurt all over, I need to go back to my room\". Pt 92% oxygen saturation.   "

## 2019-06-21 NOTE — CONSULTS
DATE OF SERVICE:  06/21/2019    ORTHOPEDIC CONSULTATION    CHIEF COMPLAINT:  Left hip fracture.    HISTORY OF PRESENT ILLNESS:  This is a 89-year-old female with dementia who by   report is currently at [facility] and early this morning fell while in the   bathroom, injuring her left hip.  She was seen at [facility]   Emergency Department around 5:00 a.m. and the on-call physician, Dr. Alexander   was contacted.  Dr. Alexander, who is my partner, contacted me around 7:00   a.m., stating that there was question as to whether or not the family wished   to proceed with any management as she is DNR/DNI.  Dr. Alexander and I decided   to place her n.p.o. and the possibility of family decided to proceed.  I   agreed to take over care.    Short time later, I was contacted by the hospitalist who had spoken with the   family and they did wish to proceed with surgery due to pain and inability to   mobilize.  I provisionally scheduled the patient for surgery and a meeting   with 3 family members at bedside currently.    PAST MEDICAL HISTORY:  1.  Dementia.  2.  GERD.  3.  Prior humerus fracture.  4.  Osteoporosis.    PAST SURGICAL HISTORY:  1.  Cardiac surgery.  2.  Bilateral bunionectomies.  3.  Shoulder replacement surgery.  4.  Kyphoplasty for osteoporosis.    REVIEW OF SYSTEMS:  Orthopedic review of systems is positive for chronic pain   associated with her osteoporosis, but otherwise unrelated to current issues.    Non-orthopedic review of systems is negative.    ALLERGIES:  INCLUDE BONIVA, DOXYCYCLINE, FOSAMAX, PENICILLIN, AND SULFA DRUGS.    Specifically, the PENICILLIN is an unknown and possible reaction only.    CURRENT MEDICATIONS:  1.  Calcium.  2.  Cholecalciferol.  3.  Multivitamins.  4.  Naprosyn.  5.  Ferrous sulfate.    SOCIAL HISTORY:  She is unemployed and currently in a memory care facility.    She does not use drugs and has never smoked.    FAMILY HISTORY:  Cancer, diabetes, cardiac disease,  hypertension.    PHYSICAL EXAMINATION:  VITAL SIGNS:  Include pulse of 89, blood pressure of 152/76, respiratory rate   of 24 with a temperature of 98.2.  CONSTITUTIONAL:  She has 3 family members at bedside and essentially is hiding   under the covers because the light bothers her eyes.  After several attempts   to communicate directly with the patient, it is apparent her dementia is to   the degree that this is futile.  HEENT:  She is cachectic with no obvious trauma to the head or neck.  CHEST:  Without respiratory wheeze or other audible abnormality.  CARDIOVASCULAR:  She has a regular rhythm without irregularity.  ABDOMEN:  Somewhat scaphoid and soft.  PELVIS:  Nontender at the right hip and right lower extremity.  She is lying   on the left hip and I did not move her due to discomfort, but clearly she has   left hip pain.  There seems to be no pain to the left knee and distally.  NEUROLOGIC: She actually will wiggle her toes bilaterally at request.  There   seems to be no sensory deficit and good pulses are noted distally.    RADIOGRAPH:  Inadequate films from the emergency department were reviewed.    Radiology interpreted this as an impacted femoral neck fracture and I am very   doubtful that is a correct diagnosis as it appears she has a comminuted   intertrochanteric hip fracture, but there is only a single view and it is not   a true AP.    DIAGNOSES:  1.  Left proximal hip fracture.  2.  Dementia with DNR/DNI status.  3.  Multiple medical ongoing issues as noted in the listing above.    PLAN:  I discussed with the family the x-ray irregularities.    RECOMMENDATIONS:  I do not believe it is appropriate to re-x-ray at this point   as it would be very painful and we know we have a proximal hip fracture and   likely intertrochanteric hip fracture.  When she is asleep in the operating   room, we will bring in fluoroscopy imaging to get a better understanding of   the fracture, which I expect will be treated  with an IM nail in lieu of the   previously anticipated percutaneous screws.    The family is well aware of the change in plan and we re-signed the new permit   and all questions were answered.  It is apparent that she is not likely going   to be able to return to [facility] as they do not have physical therapy   services and I have contacted the floor and a  to contact the   next of kin with regard to working on placement earlier instead of later.    This was my suggestion and the family certainly consensus.       ____________________________________     MD VILMA Partida / ETHAN    DD:  06/21/2019 12:48:37  DT:  06/21/2019 16:27:38    D#:  [id number]  Job#:  [id number]

## 2019-06-21 NOTE — OR NURSING
1527: To PACU from OR via bed, sleeping, respirations spontaneous and non-labored via OPA. Icepack applied over c/d/i L hip surgical dressings.  1537: Still sleeping, respirations spontaneous and non-labored via OPA.   1541: Pt awake, able to open her mouth and OPA removed, placed NC in nose. Resting comfortably, no S/S of pain.  1551: Pt awake and restless, but C/O of L eye pain, she wants to rub it, encouraged her not to, flushed it with NS and applied wet gauze over it.   1601: Pt still C/O of L eye pain when she awakens, pt encouraged not to rub it, she will cooperate for a few minutes, but then forgets and reaches for her eye again. Hip pain is tolerable, no nausea, tolerating decrease of supplemental O2.  1616: Pain in hip is still tolerable, no nausea, resting comfortably in the bed, no touching eye. Dressing remains the same, only scant blood on it. Tolerating further decrease in supplemental O2.  1626: Pain in hip is still tolerable, no nausea, resting comfortably. Meet criteria to transfer to floor, report called to INNA Gil and pt readied for transfer.

## 2019-06-21 NOTE — ANESTHESIA TIME REPORT
Anesthesia Start and Stop Event Times     Date Time Event    6/21/2019 1355 Anesthesia Start     1529 Anesthesia Stop        Responsible Staff  06/21/19    Name Role Begin End    Chris Pro M.D. Anesth 1355 1529        Preop Diagnosis (Free Text):  Pre-op Diagnosis     LEFT HIP FRACTURE        Preop Diagnosis (Codes):  Diagnosis Information     Diagnosis Code(s):         Post op Diagnosis  Femoral neck fracture (HCC)      Premium Reason  A. 3PM - 7AM    Comments:

## 2019-06-21 NOTE — OR SURGEON
Immediate Post OP Note    PreOp Diagnosis: Left proximal femur fracture    PostOp Diagnosis: Left basicervical femoral neck fracture    Procedure(s): Left Intertan IM nail of basicervical femoral neck fracture    Surgeon(s):  Wm David Hayes M.D.    Anesthesiologist/Type of Anesthesia:  Anesthesiologist: Chris Pro M.D./General    Surgical Staff:  Circulator: Denis Nair R.N.  Scrub Person: Neville cMcarty  Radiology Technologist: Ximena Silveira    Specimens removed if any:  * No specimens in log *    Dict #: [id number]      6/21/2019 3:27 PM Wm David Hayes M.D.

## 2019-06-21 NOTE — ANESTHESIA PREPROCEDURE EVALUATION
Relevant Problems   (+) Closed left hip fracture (HCC)   (+) Dementia without behavioral disturbance   (+) Dyslipidemia   (+) GERD (gastroesophageal reflux disease)       Physical Exam    Airway   Mallampati: II  TM distance: >3 FB  Neck ROM: full       Cardiovascular - normal exam  Rhythm: regular  Rate: normal  (-) murmur     Dental - normal exam         Pulmonary - normal exam  Breath sounds clear to auscultation     Abdominal    Neurological - normal exam                 Anesthesia Plan    ASA 3       Plan - general       Airway plan will be LMA        Induction: intravenous    Postoperative Plan: Postoperative administration of opioids is intended.    Pertinent diagnostic labs and testing reviewed    Informed Consent:    Anesthetic plan and risks discussed with patient.    Use of blood products discussed with: patient whom consented to blood products.

## 2019-06-21 NOTE — ED PROVIDER NOTES
"CHIEF COMPLAINT  Chief Complaint   Patient presents with   • T-5000 FALL     pt fell of the toilet today and landed on her left side       HPI  Arminda Cowart is a 89 y.o. female who presents tonight via ambulance from [facility] after she apparently fell off the toilet and landed on her left side and injured her left hip.  She has left leg shortening and bony deformity noted with extreme pain upon any type of movement.  She did not strike her head and had no loss of consciousness.  The patient has a history of dementia and is a very poor historian but she does note that she only injured her hip.  She is a DNR and comfort measures only.    REVIEW OF SYSTEMS  See HPI for further details. All other system reviews are unobtainable secondary to patient's dementia    PAST MEDICAL HISTORY  Past Medical History:   Diagnosis Date   • Anesthesia     PONV   • Arthritis     \"everywhere\"   • Dementia without behavioral disturbance 2/18/2016   • Dyslipidemia 7/22/2009   • GERD (gastroesophageal reflux disease)    • Heart burn    • Hiatus hernia syndrome    • Humerus fracture 7/22/2009   • Indigestion    • Low back pain 7/22/2009   • Osteopenia 7/22/2009   • Osteoporosis    • Pain     right shoulder-2/2009   • Preventative health care 7/22/2009   • Shingles 5/1/2012   • Shoulder pain 7/22/2009       FAMILY HISTORY  Family History   Problem Relation Age of Onset   • Diabetes Unknown    • Heart Disease Unknown    • Hypertension Unknown    • Cancer Unknown        SOCIAL HISTORY  Social History     Social History   • Marital status:      Spouse name: N/A   • Number of children: N/A   • Years of education: N/A     Social History Main Topics   • Smoking status: Never Smoker   • Smokeless tobacco: Never Used   • Alcohol use 0.0 oz/week      Comment: rare   • Drug use: No   • Sexual activity: Not on file     Other Topics Concern   • Not on file     Social History Narrative   • No narrative on file       SURGICAL " "HISTORY  Past Surgical History:   Procedure Laterality Date   • KYPHOPLASTY  4/29/2015    Performed by Sudarshan Soriano M.D. at SURGERY [facility] ORS   • SHOULDER ARTHROPLASTY TOTAL  2/13/2009    Performed by MAHSA RAY at SURGERY [facility] ORS   • CATARACT PHACO WITH IOL  10/15/08    Performed by STEVE MILLER at SURGERY SAME DAY [facility] ORS   • BUNIONECTOMY BILAT  1987   • OTHER     • OTHER ORTHOPEDIC SURGERY         CURRENT MEDICATIONS  See nurse's note    ALLERGIES  Allergies   Allergen Reactions   • Boniva [Ibandronate Sodium]      Pt denies   • Doxycycline      Pt denies   • Evista [Raloxifene Hydrochloride]      Pt denies   • Fosamax      Pt denies   • Lovastatin      Pt denies   • Penicillins      \"possibly\" reaction unk  Pt denies   • Sulfa Drugs      Pt denies       PHYSICAL EXAM  VITAL SIGNS: /63   Pulse 87   Temp 36.6 °C (97.8 °F) (Temporal)   Resp 16   Ht 1.499 m (4' 11\")   Wt 39 kg (85 lb 15.7 oz)   SpO2 95%   BMI 17.37 kg/m²     Constitutional: Patient is a thin cachectic elderly female in extreme distress secondary to her left hip pain.  HENT: Normocephalic, atraumatic, Oropharynx moist , nose normal.   Eyes: PERRL, EOMI, Conjunctiva without erythema.   Neck: Supple , Normal range of motion in flexion, extension and lateral rotation. No tenderness along the bony prominences or paraspinal muscles.   Lymphatic: No lymphadenopathy noted.   Cardiovascular: Normal heart rate and rhythm. No murmur  Thorax & Lungs: Clear and equal breath sounds with good excursion. No respiratory distress.  Abdomen: Bowel sounds normal in all four quadrants. Soft, thin, nontender with no rebound or guarding.  Skin: Warm, Dry   Back: No cervical, thoracic, or lumbosacral tenderness.  Extremities: Peripheral pulses 4/4 No edema, left leg shortening with internal rotation, bony deformity with extreme pain with any movement to the left hip.  Patient has good capillary refill, good sensation with " normal range of motion of the toes.  There are no other abnormalities noted to any extremities.  Other than a superficial abrasion to the left elbow.  Neurologic: Alert & oriented to person and place, Normal motor function, Normal sensory function, No lateralizing or focal deficits noted. DTR's 4/4 bilaterally.  Psychiatric: Affect normal, Judgment normal, Mood normal.     EKG  Twelve-lead EKG was performed and read by myself to show a sinus arrhythmia.  Her rate is 71 bpm.  There is no acute ST elevation or depression.  There is no ventricular ectopy.  There is no A-V dissociation or QT prolongation.  There are no old EKGs for comparison.    RADIOLOGY/PROCEDURES  DX-FEMUR-1 VIEW LEFT   Final Result         1.  Impacted left femoral neck fracture.   2.  Coarse calcification in the distal femoral diaphysis suggests bony infarct.   3.  Atherosclerosis      DX-CHEST-LIMITED (1 VIEW)   Final Result         1.  No acute cardiopulmonary disease.   2.  Atherosclerosis   3.  Perihilar interstitial prominence and bronchial wall cuffing, appearance suggests changes of underlying bronchial inflammation, consider bronchitis.            COURSE & MEDICAL DECISION MAKING  Pertinent Labs & Imaging studies reviewed. (See chart for detail)  Patient had received a prehospital IV with fentanyl.  I gave her morphine and Zofran here in the ER which provided partial pain control.  Despite patient's pulsed form which requested no IV's, x-rays or laboratories.    FINAL IMPRESSION  1. Fall  2.  Closed left femoral neck fracture  3.  History of dementia         Electronically signed by: Rosemarie Mendes, 6/21/2019 5:38 YAA Provider Note

## 2019-06-21 NOTE — PROGRESS NOTES
Received from pacu via bed.drowsy but awakens easily to verbal stimuli.    Iv fluids infusing.lt hip drsg intact with small amt drainage noted.  Ice pack in place.  Pablo patent with renay urine noted.  Turned to rt side.resting at present.

## 2019-06-21 NOTE — H&P
Hospital Medicine History & Physical Note    Date of Service  6/21/2019    Primary Care Physician  Franck Cordero M.D.    Consultants  Orthopedic Surgery: Dr. Alexander - ED Attending called    Code Status  DNR/ DNI    Chief Complaint  Unable to walk    History of Presenting Illness  Very poor historian.  History of dementia.  No family members by the bedside.  History was obtained mostly from ER records and conversation with the staff.    89 y.o. female, DNR/DNI patient, resident over at [facility], who was brought to the ER via EMS on 6/21/2019 for evaluation of ground-level fall and subsequent inability to walk/move due to pain.  Per report, patient fell after tripping and falling in the bathroom, landing on her left side.    Evaluation in the emergency department demonstrated impacted left femoral fracture.  Her daughter, who lives in [place], [place] was contacted since her son here in [place] was not picking up the phone.  Patient is a high risk for surgical procedures and will need orthopedic repair.  Daughter confirms CODE STATUS and desire of minimal interventions however agrees to proceed with orthopedic consult.  Dr. Alexander was consulted and will see patient in the morning.  Daughter attempting to contact her brother here in [place].    Review of Systems  Review of Systems   Constitutional: Negative for fever.   HENT: Negative for congestion and sore throat.    Eyes: Negative for blurred vision and double vision.   Respiratory: Negative for cough and shortness of breath.    Cardiovascular: Negative for chest pain and palpitations.   Gastrointestinal: Negative for nausea and vomiting.   Genitourinary: Negative for dysuria and urgency.   Musculoskeletal: Positive for falls, joint pain and myalgias. Negative for neck pain.   Skin: Negative for itching and rash.   Neurological: Negative for dizziness, weakness and headaches.   Endo/Heme/Allergies: Does not bruise/bleed easily.   Psychiatric/Behavioral:  "Negative for depression. The patient does not have insomnia.        Past Medical History   has a past medical history of Anesthesia; Arthritis; Dementia without behavioral disturbance (2/18/2016); Dyslipidemia (7/22/2009); GERD (gastroesophageal reflux disease); Heart burn; Hiatus hernia syndrome; Humerus fracture (7/22/2009); Indigestion; Low back pain (7/22/2009); Osteopenia (7/22/2009); Osteoporosis; Pain; Preventative health care (7/22/2009); Shingles (5/1/2012); and Shoulder pain (7/22/2009). She also has no past medical history of Angina; Arrhythmia; ASTHMA; Bronchitis; Cancer (HCC); CATARACT; Congestive heart failure (HCC); COPD; Diabetes; Dialysis; Glaucoma; Heart murmur; Heart valve disease; Hypertension; Jaundice; Myocardial infarct (HCC); Other specified symptom associated with female genital organs; Pacemaker; Personal history of venous thrombosis and embolism; Pneumonia; Renal disorder; Rheumatic fever; Seizure (HCC); Stroke (HCC); Unspecified disorder of thyroid; Unspecified hemorrhagic conditions; or Unspecified urinary incontinence.    Surgical History   has a past surgical history that includes cataract phaco with iol (10/15/08); bunionectomy bilat (1987); shoulder arthroplasty total (2/13/2009); other orthopedic surgery; other; and kyphoplasty (4/29/2015).     Family History  family history includes Cancer in her unknown relative; Diabetes in her unknown relative; Heart Disease in her unknown relative; Hypertension in her unknown relative.     Social History   reports that she has never smoked. She has never used smokeless tobacco. She reports that she drinks alcohol. She reports that she does not use drugs.    Allergies  Allergies   Allergen Reactions   • Boniva [Ibandronate Sodium]      Pt denies   • Doxycycline      Pt denies   • Evista [Raloxifene Hydrochloride]      Pt denies   • Fosamax      Pt denies   • Lovastatin      Pt denies   • Penicillins      \"possibly\" reaction unk  Pt denies   • " Sulfa Drugs      Pt denies       Medications  Prior to Admission Medications   Prescriptions Last Dose Informant Patient Reported? Taking?   Calcium 150 MG TABS   Yes No   Sig: Take  by mouth.   Cholecalciferol 2000 UNIT Cap   No No   Sig: Take 1 Cap by mouth every day.   Multiple Vitamin (MULTI-VITAMIN DAILY PO)   Yes No   Sig: Take  by mouth.   Naproxen Sodium (ALEVE PO)   Yes No   Sig: Take  by mouth.   ferrous sulfate (FEOSOL) 220 (44 Fe) MG/5ML Elixir   No No   Sig: Take 5 mL by mouth every day.   lidocaine (LIDODERM) 5 % Patch   No No   Sig: Apply 1 patch to affected area 12 hours on during the day and off at night   vitamin D (CHOLECALCIFEROL) 1000 UNIT Tab   No No   Sig: Take 4 Tabs by mouth every day.      Facility-Administered Medications: None       Physical Exam  Temp:  [36.6 °C (97.8 °F)] 36.6 °C (97.8 °F)  Pulse:  [64-87] 87  Resp:  [16-19] 16  BP: (110-155)/(63-78) 110/63  SpO2:  [80 %-96 %] 95 %    Physical Exam   Constitutional: She is oriented to person, place, and time. She appears well-developed and well-nourished.   HENT:   Head: Normocephalic and atraumatic.   Eyes: Pupils are equal, round, and reactive to light. EOM are normal.   Neck: Normal range of motion. Neck supple.   Cardiovascular: Normal rate and regular rhythm.    Pulmonary/Chest: Effort normal and breath sounds normal.   Abdominal: Soft. Bowel sounds are normal.   Musculoskeletal: Normal range of motion. She exhibits no edema.   Limited ROM due to pain   Neurological: She is alert and oriented to person, place, and time.   Skin: Skin is warm and dry.   Psychiatric: She has a normal mood and affect. Her behavior is normal.       Laboratory:  Recent Labs      06/21/19   0230   WBC  13.8*   RBC  4.36   HEMOGLOBIN  13.4   HEMATOCRIT  43.1   MCV  98.9*   MCH  30.7   MCHC  31.1*   RDW  43.6   PLATELETCT  201   MPV  8.7*     Recent Labs      06/21/19   0230   SODIUM  138   POTASSIUM  4.3   CHLORIDE  103   CO2  27   GLUCOSE  162*   BUN  17    CREATININE  0.71   CALCIUM  9.0     Recent Labs      06/21/19   0230   ALTSGPT  15   ASTSGOT  26   ALKPHOSPHAT  50   TBILIRUBIN  0.7   GLUCOSE  162*     Recent Labs      06/21/19   0230   APTT  26.6   INR  1.11             No results for input(s): TROPONINI in the last 72 hours.    Urinalysis:    Recent Labs      06/21/19   0330   SPECGRAVITY  1.015   GLUCOSEUR  Negative   KETONES  Trace*   NITRITE  Negative   LEUKESTERAS  Moderate*   WBCURINE  10-20*   RBCURINE  0-2   BACTERIA  Many*   EPITHELCELL  Few        Imaging:  DX-FEMUR-1 VIEW LEFT   Final Result         1.  Impacted left femoral neck fracture.   2.  Coarse calcification in the distal femoral diaphysis suggests bony infarct.   3.  Atherosclerosis      DX-CHEST-LIMITED (1 VIEW)   Final Result         1.  No acute cardiopulmonary disease.   2.  Atherosclerosis   3.  Perihilar interstitial prominence and bronchial wall cuffing, appearance suggests changes of underlying bronchial inflammation, consider bronchitis.            Assessment/Plan:  I anticipate this patient will require at least two midnights for appropriate medical management, necessitating inpatient admission.    * Closed left hip fracture (HCC)   Assessment & Plan    -Status post ground-level fall.  -Pain control.  Patient became hypotensive after 2 mg of morphine, reason why I will only give her 1 mg every 4 hours for pain control.  She is only 39 kg (85 pounds).   -N.p.o. for possible surgical procedure today.  -Appreciate orthopedic surgery consult and recommendations: Dr. lAexander  -Patient is 89 years old and high risk for surgical complications.  She is a DNR/DNI and desires minimal interventions on her pulsed however family considering to proceed with procedure.     Preoperative cardiovascular examination   Assessment & Plan    -Cardiovascular risk is high for her.  She is 89 years old.  -Patient does have a impacted left femoral neck fracture that we need a surgical repair.  -I still  believe that the benefits of surgery at this time out weights the risks.  Family discussing plan and awaiting phone call from daughter and son with more details.     Elevated glucose   Assessment & Plan    -Likely reactive.  Glucose on admission is 162.  Will monitor for now.     Dementia without behavioral disturbance- (present on admission)   Assessment & Plan    -High risk for behavior problems.  She is calm and pain-free at this time.  We will continue to monitor.     GERD (gastroesophageal reflux disease)- (present on admission)   Assessment & Plan    -May need medication PRN.         VTE prophylaxis: SCD's

## 2019-06-21 NOTE — DISCHARGE PLANNING
FELIPE met with patient's son, Satish to discuss patient discharge plan. Satish is under the impression that this patient will need to go to SNF prior to returning to [facility].  SNF choice list provided to Satish. FELIPE explained process of post surgery, therapy evaluation, MD recommendation and then sending out referrals. Satish will start look at facilities incase SNF is recommended.

## 2019-06-21 NOTE — ASSESSMENT & PLAN NOTE
No behavior issues except repeated removal of IV, castillo- had to order mittens  Needs IV 2/2 hypotension, anemia

## 2019-06-21 NOTE — ED TRIAGE NOTES
Chief Complaint   Patient presents with   • T-5000 FALL     pt fell of the toilet today and landed on her left side     Pt BIB [facility]. Pt fell off the toilet today at [facility]. Pt complains of left hip pain and lower back pain.  External rotation and elongation of left leg noted.  CMS intact.

## 2019-06-21 NOTE — ED NOTES
Report given to INNA gallegos. RN made aware to communicate to surgeon to contact the son to discuss POC. RN also made aware of POLST. POLST in pt's chart.

## 2019-06-21 NOTE — ANESTHESIA POSTPROCEDURE EVALUATION
Patient: Arminda Cowart    Procedure Summary     Date:  06/21/19 Room / Location:   OR 02 / SURGERY [facility]    Anesthesia Start:  1355 Anesthesia Stop:  1529    Procedure:  Intramedulary Nailing Left Femoral Neck Fracture (Left Hip) Diagnosis:  (LEFT FEMORAL NECK FRACTURE)    Surgeon:  Wm David Hayes M.D. Responsible Provider:  Chris Pro M.D.    Anesthesia Type:  general ASA Status:  3          Final Anesthesia Type: general  Last vitals  BP   Blood Pressure : 126/61, NIBP: (!) 91/60    Temp   37.6 °C (99.7 °F)    Pulse   Pulse: 89, Heart Rate (Monitored): 97   Resp   12    SpO2   91 %      Anesthesia Post Evaluation    Patient location during evaluation: PACU  Patient participation: complete - patient participated  Level of consciousness: awake and alert    Airway patency: patent  Anesthetic complications: no  Cardiovascular status: hemodynamically stable  Respiratory status: acceptable  Hydration status: euvolemic    PONV: none

## 2019-06-21 NOTE — ANESTHESIA QCDR
2019 [facility] Clinical Data Registry (for Quality Improvement)     Postoperative nausea/vomiting risk protocol (Adult = 18 yrs and Pediatric 3-17 yrs)- (430 and 463)  General inhalation anesthetic (NOT TIVA) with PONV risk factors: Yes  Provision of anti-emetic therapy with at least 2 different classes of agents: Yes   Patient DID NOT receive anti-emetic therapy and reason is documented in Medical Record:  N/A    Multimodal Pain Management- (AQI59)  Patient undergoing Elective Surgery (i.e. Outpatient, or ASC, or Prescheduled Surgery prior to Hospital Admission): Yes  Use of Multimodal Pain Management, two or more drugs and/or interventions, NOT including systemic opioids: Yes   Exception: Documented allergy to multiple classes of analgesics:  N/A    PACU assessment of acute postoperative pain prior to Anesthesia Care End- Applies to Patients Age = 18- (ABG7)  Initial PACU pain score is which of the following: < 7/10  Patient unable to report pain score: N/A    Post-anesthetic transfer of care checklist/protocol to PACU/ICU- (426 and 427)  Upon conclusion of case, patient transferred to which of the following locations: PACU/Non-ICU  Use of transfer checklist/protocol: Yes  Exclusion: Service Performed in Patient Hospital Room (and thus did not require transfer): N/A    PACU Reintubation- (AQI31)  General anesthesia requiring endotracheal intubation (ETT) along with subsequent extubation in OR or PACU: No  Required reintubation in the PACU: N/A  Extubation was a planned trial documented in the medical record prior to removal of the original airway device: N/A    Unplanned admission to ICU related to anesthesia service up through end of PACU care- (MD51)  Unplanned admission to ICU (not initially anticipated at anesthesia start time): No

## 2019-06-21 NOTE — PROGRESS NOTES
admitted this AM  89 YO female resident of [facility] w/ advanced dementia, presents w/ GLF and femoral Fx. BP's noted to be labile (? 2/2 pain) She is DNR, Ortho consult pending.    Family is at bedside  Patient is confused, has pulled out castillo X3 wants to eat.  Xrays reviewed  VS- BP labile 2/2 confusion and pain- will not treat BP's  POC discussed w/ family and RN    Will start diet if no surgery today.

## 2019-06-22 PROBLEM — D62 ANEMIA ASSOCIATED WITH ACUTE BLOOD LOSS: Status: ACTIVE | Noted: 2019-06-22

## 2019-06-22 LAB
ANION GAP SERPL CALC-SCNC: 5 MMOL/L (ref 0–11.9)
BASOPHILS # BLD AUTO: 0.1 % (ref 0–1.8)
BASOPHILS # BLD: 0.01 K/UL (ref 0–0.12)
BUN SERPL-MCNC: 18 MG/DL (ref 8–22)
CALCIUM SERPL-MCNC: 7.4 MG/DL (ref 8.4–10.2)
CHLORIDE SERPL-SCNC: 112 MMOL/L (ref 96–112)
CO2 SERPL-SCNC: 23 MMOL/L (ref 20–33)
CREAT SERPL-MCNC: 0.82 MG/DL (ref 0.5–1.4)
EOSINOPHIL # BLD AUTO: 0.02 K/UL (ref 0–0.51)
EOSINOPHIL NFR BLD: 0.3 % (ref 0–6.9)
ERYTHROCYTE [DISTWIDTH] IN BLOOD BY AUTOMATED COUNT: 47.9 FL (ref 35.9–50)
ERYTHROCYTE [DISTWIDTH] IN BLOOD BY AUTOMATED COUNT: 48.3 FL (ref 35.9–50)
GLUCOSE SERPL-MCNC: 116 MG/DL (ref 65–99)
HCT VFR BLD AUTO: 24.4 % (ref 37–47)
HCT VFR BLD AUTO: 27.4 % (ref 37–47)
HGB BLD-MCNC: 7.3 G/DL (ref 12–16)
HGB BLD-MCNC: 8 G/DL (ref 12–16)
IMM GRANULOCYTES # BLD AUTO: 0.03 K/UL (ref 0–0.11)
IMM GRANULOCYTES NFR BLD AUTO: 0.4 % (ref 0–0.9)
LYMPHOCYTES # BLD AUTO: 1.28 K/UL (ref 1–4.8)
LYMPHOCYTES NFR BLD: 17.8 % (ref 22–41)
MCH RBC QN AUTO: 30.8 PG (ref 27–33)
MCH RBC QN AUTO: 31.5 PG (ref 27–33)
MCHC RBC AUTO-ENTMCNC: 29.2 G/DL (ref 33.6–35)
MCHC RBC AUTO-ENTMCNC: 29.9 G/DL (ref 33.6–35)
MCV RBC AUTO: 105.2 FL (ref 81.4–97.8)
MCV RBC AUTO: 105.4 FL (ref 81.4–97.8)
MONOCYTES # BLD AUTO: 0.54 K/UL (ref 0–0.85)
MONOCYTES NFR BLD AUTO: 7.5 % (ref 0–13.4)
NEUTROPHILS # BLD AUTO: 5.33 K/UL (ref 2–7.15)
NEUTROPHILS NFR BLD: 73.9 % (ref 44–72)
NRBC # BLD AUTO: 0 K/UL
NRBC BLD-RTO: 0 /100 WBC
PLATELET # BLD AUTO: 120 K/UL (ref 164–446)
PLATELET # BLD AUTO: 140 K/UL (ref 164–446)
PMV BLD AUTO: 9.1 FL (ref 9–12.9)
PMV BLD AUTO: 9.2 FL (ref 9–12.9)
POTASSIUM SERPL-SCNC: 4 MMOL/L (ref 3.6–5.5)
RBC # BLD AUTO: 2.32 M/UL (ref 4.2–5.4)
RBC # BLD AUTO: 2.6 M/UL (ref 4.2–5.4)
SODIUM SERPL-SCNC: 140 MMOL/L (ref 135–145)
WBC # BLD AUTO: 7.2 K/UL (ref 4.8–10.8)
WBC # BLD AUTO: 8.4 K/UL (ref 4.8–10.8)

## 2019-06-22 PROCEDURE — 97165 OT EVAL LOW COMPLEX 30 MIN: CPT

## 2019-06-22 PROCEDURE — 700111 HCHG RX REV CODE 636 W/ 250 OVERRIDE (IP): Performed by: ORTHOPAEDIC SURGERY

## 2019-06-22 PROCEDURE — 700105 HCHG RX REV CODE 258: Performed by: INTERNAL MEDICINE

## 2019-06-22 PROCEDURE — 700105 HCHG RX REV CODE 258: Performed by: ORTHOPAEDIC SURGERY

## 2019-06-22 PROCEDURE — 770006 HCHG ROOM/CARE - MED/SURG/GYN SEMI*

## 2019-06-22 PROCEDURE — 700105 HCHG RX REV CODE 258: Performed by: HOSPITALIST

## 2019-06-22 PROCEDURE — 700111 HCHG RX REV CODE 636 W/ 250 OVERRIDE (IP): Performed by: INTERNAL MEDICINE

## 2019-06-22 PROCEDURE — 700102 HCHG RX REV CODE 250 W/ 637 OVERRIDE(OP): Performed by: ORTHOPAEDIC SURGERY

## 2019-06-22 PROCEDURE — 700111 HCHG RX REV CODE 636 W/ 250 OVERRIDE (IP): Performed by: HOSPITALIST

## 2019-06-22 PROCEDURE — 36415 COLL VENOUS BLD VENIPUNCTURE: CPT

## 2019-06-22 PROCEDURE — 700112 HCHG RX REV CODE 229: Performed by: ORTHOPAEDIC SURGERY

## 2019-06-22 PROCEDURE — 80048 BASIC METABOLIC PNL TOTAL CA: CPT

## 2019-06-22 PROCEDURE — 85025 COMPLETE CBC W/AUTO DIFF WBC: CPT

## 2019-06-22 PROCEDURE — 85027 COMPLETE CBC AUTOMATED: CPT

## 2019-06-22 PROCEDURE — 94760 N-INVAS EAR/PLS OXIMETRY 1: CPT

## 2019-06-22 PROCEDURE — A9270 NON-COVERED ITEM OR SERVICE: HCPCS | Performed by: ORTHOPAEDIC SURGERY

## 2019-06-22 PROCEDURE — 700105 HCHG RX REV CODE 258: Performed by: EMERGENCY MEDICINE

## 2019-06-22 PROCEDURE — 99233 SBSQ HOSP IP/OBS HIGH 50: CPT | Performed by: INTERNAL MEDICINE

## 2019-06-22 PROCEDURE — 97162 PT EVAL MOD COMPLEX 30 MIN: CPT

## 2019-06-22 RX ORDER — AMOXICILLIN 250 MG
2 CAPSULE ORAL
Status: DISCONTINUED | OUTPATIENT
Start: 2019-06-22 | End: 2019-06-24 | Stop reason: HOSPADM

## 2019-06-22 RX ORDER — SODIUM CHLORIDE 9 MG/ML
500 INJECTION, SOLUTION INTRAVENOUS ONCE
Status: COMPLETED | OUTPATIENT
Start: 2019-06-22 | End: 2019-06-22

## 2019-06-22 RX ORDER — TRAMADOL HYDROCHLORIDE 50 MG/1
25 TABLET ORAL EVERY 6 HOURS PRN
Status: DISCONTINUED | OUTPATIENT
Start: 2019-06-22 | End: 2019-06-24 | Stop reason: HOSPADM

## 2019-06-22 RX ADMIN — SODIUM CHLORIDE 500 ML: 9 INJECTION, SOLUTION INTRAVENOUS at 05:03

## 2019-06-22 RX ADMIN — DOCUSATE SODIUM 100 MG: 100 CAPSULE, LIQUID FILLED ORAL at 05:02

## 2019-06-22 RX ADMIN — SODIUM CHLORIDE: 9 INJECTION, SOLUTION INTRAVENOUS at 17:26

## 2019-06-22 RX ADMIN — SODIUM CHLORIDE 500 ML: 9 INJECTION, SOLUTION INTRAVENOUS at 05:05

## 2019-06-22 RX ADMIN — ACETAMINOPHEN 1000 MG: 500 TABLET, FILM COATED ORAL at 05:02

## 2019-06-22 RX ADMIN — VANCOMYCIN 1000 MG: 1 INJECTION, SOLUTION INTRAVENOUS at 15:06

## 2019-06-22 RX ADMIN — CEFTRIAXONE 1 G: 1 INJECTION, POWDER, FOR SOLUTION INTRAMUSCULAR; INTRAVENOUS at 06:00

## 2019-06-22 RX ADMIN — KETOROLAC TROMETHAMINE 15 MG: 30 INJECTION, SOLUTION INTRAMUSCULAR; INTRAVENOUS at 11:04

## 2019-06-22 RX ADMIN — KETOROLAC TROMETHAMINE 15 MG: 30 INJECTION, SOLUTION INTRAMUSCULAR; INTRAVENOUS at 05:02

## 2019-06-22 RX ADMIN — ACETAMINOPHEN 1000 MG: 500 TABLET, FILM COATED ORAL at 23:12

## 2019-06-22 RX ADMIN — SODIUM CHLORIDE 2 G: 9 INJECTION, SOLUTION INTRAVENOUS at 05:02

## 2019-06-22 RX ADMIN — KETOROLAC TROMETHAMINE 15 MG: 30 INJECTION, SOLUTION INTRAMUSCULAR; INTRAVENOUS at 23:22

## 2019-06-22 ASSESSMENT — PAIN SCALES - PAIN ASSESSMENT IN ADVANCED DEMENTIA (PAINAD)
FACIALEXPRESSION: SMILING OR INEXPRESSIVE
BREATHING: NORMAL
BREATHING: NORMAL
CONSOLABILITY: NO NEED TO CONSOLE
TOTALSCORE: 2
TOTALSCORE: 0
BODYLANGUAGE: RELAXED
BODYLANGUAGE: TENSE, DISTRESSED PACING, FIDGETING
CONSOLABILITY: DISTRACTED OR REASSURED BY VOICE/TOUCH
FACIALEXPRESSION: SMILING OR INEXPRESSIVE

## 2019-06-22 ASSESSMENT — COGNITIVE AND FUNCTIONAL STATUS - GENERAL
MOVING FROM LYING ON BACK TO SITTING ON SIDE OF FLAT BED: A LOT
TURNING FROM BACK TO SIDE WHILE IN FLAT BAD: A LOT
WALKING IN HOSPITAL ROOM: A LOT
MOVING TO AND FROM BED TO CHAIR: A LOT
STANDING UP FROM CHAIR USING ARMS: A LOT
SUGGESTED CMS G CODE MODIFIER MOBILITY: CL
MOBILITY SCORE: 11
CLIMB 3 TO 5 STEPS WITH RAILING: TOTAL

## 2019-06-22 ASSESSMENT — GAIT ASSESSMENTS
DISTANCE (FEET): 3
DEVIATION: ANTALGIC
GAIT LEVEL OF ASSIST: MODERATE ASSIST
ASSISTIVE DEVICE: FRONT WHEEL WALKER

## 2019-06-22 ASSESSMENT — ACTIVITIES OF DAILY LIVING (ADL): TOILETING: INDEPENDENT

## 2019-06-22 NOTE — THERAPY
"Physical Therapy Evaluation completed.   Bed Mobility:  Supine to Sit: Moderate Assist  Transfers: Sit to Stand: Minimal Assist  Gait: Level Of Assist: Moderate Assist with Front-Wheel Walker x3 ft, not able to maintain PWB LLE       Plan of Care: Will benefit from Physical Therapy 4 times per week  Discharge Recommendations: Equipment: Will Continue to Assess for Equipment Needs. Post-acute therapy Discharge to a transitional care facility for continued skilled therapy services.    Patient is 89/F with h/o dementia, admitted with GLF and L femoral neck fx. Patient now s/p surgical fixation, POD #1. Patient is to be PWB, 50% on LLE. Patient and family edu RE: WB precautions, patient not able to maintain PWB. Attempted NWB, patient not able to maintain. Patient required MOD A for transfers and bed mobility. Acute PT indicated to progress mobility. Rec stand pivot only at this time due to patient not able to maintain WB precautions. Patient will need physical assist and post acute services at d/c.     See \"Rehab Therapy-Acute\" Patient Summary Report for complete documentation.     "

## 2019-06-22 NOTE — THERAPY
"Occupational Therapy Evaluation completed.   Functional Status:  Pt with history of dementia was seen for OT s/p L femur fracture from glf. Pt is confused, she was able to follow simple directions with moderate support. Pt completed sit to stand transfer to FWW with modA to get back to bed from chair, pt observed to be unable to maintain PWB precautions when standing and during stand pivot transfer. Pt required modA for bed mobility moving from sit to supine. Pt able to roll to R side with modA to place chair alarm while in bed. Pt c/o of pain during all activity, RN notified.Pt presents with difficulty maintaining PWB precautions, impaired functional mobility, pain with mobility, and decreased functional ADL status, impacting safe participation in ADLs and transfers.   Plan of Care: Will benefit from Occupational Therapy 3 times per week  Discharge Recommendations:  Equipment: Will Continue to Assess for Equipment Needs. Post-acute therapy Discharge to a transitional care facility for continued skilled therapy services. Pt lives at memory care facility and family would like her to return to [facility].     See \"Rehab Therapy-Acute\" Patient Summary Report for complete documentation.      "

## 2019-06-22 NOTE — PROGRESS NOTES
"S: Sitting comfortably in chair drinking a milk. \"Less pain than before.\"    O: Toes wiggle.Declines sensory deficit. Dressing dry and intact.     /95   Pulse 88   Temp 36.7 °C (98.1 °F) (Oral)   Resp 18   Ht 1.499 m (4' 11\")   Wt 39 kg (85 lb 15.7 oz)   SpO2 90%   Recent Labs      06/21/19   0230  06/22/19   0703   WBC  13.8*  7.2   RBC  4.36  2.32*   HEMOGLOBIN  13.4  7.3*   HEMATOCRIT  43.1  24.4*   MCV  98.9*  105.2*   MCH  30.7  31.5   MCHC  31.1*  29.9*   RDW  43.6  48.3   PLATELETCT  201  120*   MPV  8.7*  9.1     Recent Labs      06/21/19   0230  06/22/19   0703   RBC  4.36  2.32*   HEMOGLOBIN  13.4  7.3*   HEMATOCRIT  43.1  24.4*   PLATELETCT  201  120*   PROTHROMBTM  14.5   --    APTT  26.6   --    INR  1.11   --      Recent Labs      06/21/19   0230  06/21/19   0907  06/22/19   0500   SODIUM  138  137  140   POTASSIUM  4.3  4.0  4.0   CHLORIDE  103  104  112   CO2  27  27  23   GLUCOSE  162*  128*  116*   BUN  17  16  18   CREATININE  0.71  0.58  0.82   CALCIUM  9.0  8.1*  7.4*     Recent Labs      06/21/19   0230   APTT  26.6   INR  1.11       A: Doing well POD #1  Decr Hgb likely due to fracture bleeding combined with surgery. Not hemodynamically compromised at present but required fluid bolus during night.      P: Progress PT/OT and placement.       "

## 2019-06-22 NOTE — PROGRESS NOTES
"Pharmacy Kinetics 89 y.o. female on vancomycin day # 1 2019    Currently on Vancomycin 1000 mg load X 1 dose    Indication for Treatment: UTI    Pertinent history per medical record: Admitted on 2019 for Displaced fracture of base of neck of left femur, initial encuonter for closed fracture.    Other antibiotics: None    Allergies: Boniva [ibandronate sodium]; Doxycycline; Evista [raloxifene hydrochloride]; Fosamax; Lovastatin; Penicillins; and Sulfa drugs     List concerns for renal function:  Age 89    Pertinent cultures to date:    Urine Group D Enterococcus    MRSA nares swab if pneumonia is a concern (ordered/positive/negative/n-a): N/A    Recent Labs      19   0230  19   0703   WBC  13.8*  7.2   NEUTSPOLYS  84.30*  73.90*     Recent Labs      19   0230  19   0907  19   0500   BUN  17  16  18   CREATININE  0.71  0.58  0.82   ALBUMIN  3.6   --    --      No results for input(s): VANCOTROUGH, VANCOPEAK, VANCORANDOM in the last 72 hours.  Intake/Output Summary (Last 24 hours) at 19 1254  Last data filed at 19 0801   Gross per 24 hour   Intake             1000 ml   Output              550 ml   Net              450 ml      /95   Pulse 88   Temp 36.7 °C (98.1 °F) (Oral)   Resp 18   Ht 1.499 m (4' 11\")   Wt 39 kg (85 lb 15.7 oz)   SpO2 90%  Temp (24hrs), Av.1 °C (98.8 °F), Min:36.7 °C (98.1 °F), Max:37.6 °C (99.7 °F)      A/P   1. Vancomycin dose change: New start  2. Next vancomycin level:  12:30  3. Goal trough: 12-60 mcg/ ml  4. Comments: Will continue to monitor and adjust dose as needed per protocol.    Valery Spencer    "

## 2019-06-22 NOTE — FLOWSHEET NOTE
06/22/19 0801   Interdisciplinary Plan of Care-Goals (Indications)   Hyperinflation Protocol Indications (post-op IS)   Education   Education Yes - Pt. / Family has been Instructed in use of Respiratory Equipment   Incentive Spirometry Group   Incentive Spirometry Instruction Yes   Incentive Spirometer Volume 900 mL   Chest Exam   Respiration 18   Pulse 88   Breath Sounds   RUL Breath Sounds Clear   RML Breath Sounds Clear   RLL Breath Sounds Clear   MANDI Breath Sounds Clear   LLL Breath Sounds Clear   Oxygen   Home O2 Use Prior To Admission? No   Pulse Oximetry 90 %   O2 Daily Delivery Respiratory  Room Air with O2 Available

## 2019-06-23 ENCOUNTER — APPOINTMENT (OUTPATIENT)
Dept: RADIOLOGY | Facility: MEDICAL CENTER | Age: 84
DRG: 480 | End: 2019-06-23
Attending: INTERNAL MEDICINE
Payer: MEDICARE

## 2019-06-23 PROBLEM — J96.01 ACUTE RESPIRATORY FAILURE WITH HYPOXIA (HCC): Status: ACTIVE | Noted: 2019-06-23

## 2019-06-23 PROBLEM — K59.00 CONSTIPATED: Status: ACTIVE | Noted: 2019-06-23

## 2019-06-23 PROBLEM — R07.9 CHEST PAIN: Status: ACTIVE | Noted: 2019-06-23

## 2019-06-23 LAB
ABO GROUP BLD: NORMAL
ANION GAP SERPL CALC-SCNC: 9 MMOL/L (ref 0–11.9)
BACTERIA UR CULT: ABNORMAL
BACTERIA UR CULT: ABNORMAL
BARCODED ABORH UBTYP: 6200
BARCODED PRD CODE UBPRD: NORMAL
BARCODED UNIT NUM UBUNT: NORMAL
BASOPHILS # BLD AUTO: 0.1 % (ref 0–1.8)
BASOPHILS # BLD: 0.01 K/UL (ref 0–0.12)
BLD GP AB SCN SERPL QL: NORMAL
BNP SERPL-MCNC: 312 PG/ML (ref 0–100)
BUN SERPL-MCNC: 25 MG/DL (ref 8–22)
CALCIUM SERPL-MCNC: 7.4 MG/DL (ref 8.4–10.2)
CHLORIDE SERPL-SCNC: 112 MMOL/L (ref 96–112)
CO2 SERPL-SCNC: 20 MMOL/L (ref 20–33)
COMPONENT R 8504R: NORMAL
CREAT SERPL-MCNC: 0.64 MG/DL (ref 0.5–1.4)
EKG IMPRESSION: NORMAL
EOSINOPHIL # BLD AUTO: 0.1 K/UL (ref 0–0.51)
EOSINOPHIL NFR BLD: 1.1 % (ref 0–6.9)
ERYTHROCYTE [DISTWIDTH] IN BLOOD BY AUTOMATED COUNT: 47.5 FL (ref 35.9–50)
GLUCOSE SERPL-MCNC: 141 MG/DL (ref 65–99)
HCT VFR BLD AUTO: 21.6 % (ref 37–47)
HCT VFR BLD AUTO: 27.3 % (ref 37–47)
HGB BLD-MCNC: 6.6 G/DL (ref 12–16)
HGB BLD-MCNC: 8.6 G/DL (ref 12–16)
IMM GRANULOCYTES # BLD AUTO: 0.05 K/UL (ref 0–0.11)
IMM GRANULOCYTES NFR BLD AUTO: 0.5 % (ref 0–0.9)
LYMPHOCYTES # BLD AUTO: 1.75 K/UL (ref 1–4.8)
LYMPHOCYTES NFR BLD: 18.4 % (ref 22–41)
MCH RBC QN AUTO: 31.3 PG (ref 27–33)
MCHC RBC AUTO-ENTMCNC: 30.6 G/DL (ref 33.6–35)
MCV RBC AUTO: 102.4 FL (ref 81.4–97.8)
MONOCYTES # BLD AUTO: 0.59 K/UL (ref 0–0.85)
MONOCYTES NFR BLD AUTO: 6.2 % (ref 0–13.4)
NEUTROPHILS # BLD AUTO: 7.01 K/UL (ref 2–7.15)
NEUTROPHILS NFR BLD: 73.7 % (ref 44–72)
NRBC # BLD AUTO: 0 K/UL
NRBC BLD-RTO: 0 /100 WBC
PLATELET # BLD AUTO: 121 K/UL (ref 164–446)
PMV BLD AUTO: 9.3 FL (ref 9–12.9)
POTASSIUM SERPL-SCNC: 3.9 MMOL/L (ref 3.6–5.5)
PRODUCT TYPE UPROD: NORMAL
RBC # BLD AUTO: 2.11 M/UL (ref 4.2–5.4)
RH BLD: NORMAL
SIGNIFICANT IND 70042: ABNORMAL
SITE SITE: ABNORMAL
SODIUM SERPL-SCNC: 141 MMOL/L (ref 135–145)
SOURCE SOURCE: ABNORMAL
TROPONIN I SERPL-MCNC: 0.04 NG/ML (ref 0–0.04)
UNIT STATUS USTAT: NORMAL
WBC # BLD AUTO: 9.5 K/UL (ref 4.8–10.8)

## 2019-06-23 PROCEDURE — 83880 ASSAY OF NATRIURETIC PEPTIDE: CPT

## 2019-06-23 PROCEDURE — A9270 NON-COVERED ITEM OR SERVICE: HCPCS | Performed by: ORTHOPAEDIC SURGERY

## 2019-06-23 PROCEDURE — 93010 ELECTROCARDIOGRAM REPORT: CPT | Performed by: INTERNAL MEDICINE

## 2019-06-23 PROCEDURE — 30233N1 TRANSFUSION OF NONAUTOLOGOUS RED BLOOD CELLS INTO PERIPHERAL VEIN, PERCUTANEOUS APPROACH: ICD-10-PCS | Performed by: INTERNAL MEDICINE

## 2019-06-23 PROCEDURE — 85018 HEMOGLOBIN: CPT

## 2019-06-23 PROCEDURE — 700105 HCHG RX REV CODE 258: Performed by: EMERGENCY MEDICINE

## 2019-06-23 PROCEDURE — 84484 ASSAY OF TROPONIN QUANT: CPT

## 2019-06-23 PROCEDURE — 700112 HCHG RX REV CODE 229: Performed by: ORTHOPAEDIC SURGERY

## 2019-06-23 PROCEDURE — P9016 RBC LEUKOCYTES REDUCED: HCPCS

## 2019-06-23 PROCEDURE — 85025 COMPLETE CBC W/AUTO DIFF WBC: CPT

## 2019-06-23 PROCEDURE — 700111 HCHG RX REV CODE 636 W/ 250 OVERRIDE (IP): Performed by: INTERNAL MEDICINE

## 2019-06-23 PROCEDURE — 80048 BASIC METABOLIC PNL TOTAL CA: CPT

## 2019-06-23 PROCEDURE — 700105 HCHG RX REV CODE 258: Performed by: HOSPITALIST

## 2019-06-23 PROCEDURE — 700105 HCHG RX REV CODE 258: Performed by: INTERNAL MEDICINE

## 2019-06-23 PROCEDURE — 85014 HEMATOCRIT: CPT

## 2019-06-23 PROCEDURE — 770006 HCHG ROOM/CARE - MED/SURG/GYN SEMI*

## 2019-06-23 PROCEDURE — 86900 BLOOD TYPING SEROLOGIC ABO: CPT

## 2019-06-23 PROCEDURE — 99233 SBSQ HOSP IP/OBS HIGH 50: CPT | Performed by: INTERNAL MEDICINE

## 2019-06-23 PROCEDURE — 86850 RBC ANTIBODY SCREEN: CPT

## 2019-06-23 PROCEDURE — 86901 BLOOD TYPING SEROLOGIC RH(D): CPT

## 2019-06-23 PROCEDURE — 700102 HCHG RX REV CODE 250 W/ 637 OVERRIDE(OP): Performed by: ORTHOPAEDIC SURGERY

## 2019-06-23 PROCEDURE — 71045 X-RAY EXAM CHEST 1 VIEW: CPT

## 2019-06-23 PROCEDURE — 36430 TRANSFUSION BLD/BLD COMPNT: CPT

## 2019-06-23 PROCEDURE — 700111 HCHG RX REV CODE 636 W/ 250 OVERRIDE (IP): Performed by: ORTHOPAEDIC SURGERY

## 2019-06-23 PROCEDURE — 36415 COLL VENOUS BLD VENIPUNCTURE: CPT

## 2019-06-23 PROCEDURE — 93005 ELECTROCARDIOGRAM TRACING: CPT | Performed by: INTERNAL MEDICINE

## 2019-06-23 PROCEDURE — 86923 COMPATIBILITY TEST ELECTRIC: CPT

## 2019-06-23 RX ORDER — SODIUM CHLORIDE 9 MG/ML
500 INJECTION, SOLUTION INTRAVENOUS ONCE
Status: COMPLETED | OUTPATIENT
Start: 2019-06-23 | End: 2019-06-23

## 2019-06-23 RX ADMIN — DOCUSATE SODIUM 100 MG: 100 CAPSULE, LIQUID FILLED ORAL at 05:55

## 2019-06-23 RX ADMIN — KETOROLAC TROMETHAMINE 15 MG: 30 INJECTION, SOLUTION INTRAMUSCULAR; INTRAVENOUS at 05:54

## 2019-06-23 RX ADMIN — POLYETHYLENE GLYCOL 3350 1 PACKET: 17 POWDER, FOR SOLUTION ORAL at 10:32

## 2019-06-23 RX ADMIN — KETOROLAC TROMETHAMINE 15 MG: 30 INJECTION, SOLUTION INTRAMUSCULAR; INTRAVENOUS at 18:36

## 2019-06-23 RX ADMIN — ACETAMINOPHEN 1000 MG: 500 TABLET, FILM COATED ORAL at 05:54

## 2019-06-23 RX ADMIN — KETOROLAC TROMETHAMINE 15 MG: 30 INJECTION, SOLUTION INTRAMUSCULAR; INTRAVENOUS at 13:22

## 2019-06-23 RX ADMIN — SODIUM CHLORIDE: 9 INJECTION, SOLUTION INTRAVENOUS at 01:28

## 2019-06-23 RX ADMIN — KETOROLAC TROMETHAMINE 15 MG: 30 INJECTION, SOLUTION INTRAMUSCULAR; INTRAVENOUS at 23:36

## 2019-06-23 RX ADMIN — SODIUM CHLORIDE 500 ML: 9 INJECTION, SOLUTION INTRAVENOUS at 00:51

## 2019-06-23 RX ADMIN — VANCOMYCIN HYDROCHLORIDE 600 MG: 500 INJECTION, POWDER, LYOPHILIZED, FOR SOLUTION INTRAVENOUS at 13:23

## 2019-06-23 ASSESSMENT — COGNITIVE AND FUNCTIONAL STATUS - GENERAL
DRESSING REGULAR LOWER BODY CLOTHING: A LOT
TURNING FROM BACK TO SIDE WHILE IN FLAT BAD: A LITTLE
STANDING UP FROM CHAIR USING ARMS: TOTAL
PERSONAL GROOMING: A LOT
MOVING TO AND FROM BED TO CHAIR: A LITTLE
DRESSING REGULAR UPPER BODY CLOTHING: A LOT
HELP NEEDED FOR BATHING: TOTAL
WALKING IN HOSPITAL ROOM: TOTAL
MOBILITY SCORE: 12
CLIMB 3 TO 5 STEPS WITH RAILING: TOTAL
TOILETING: A LOT
SUGGESTED CMS G CODE MODIFIER MOBILITY: CL
EATING MEALS: A LOT
MOVING FROM LYING ON BACK TO SITTING ON SIDE OF FLAT BED: A LITTLE
SUGGESTED CMS G CODE MODIFIER DAILY ACTIVITY: CL
DAILY ACTIVITIY SCORE: 11

## 2019-06-23 ASSESSMENT — PAIN SCALES - PAIN ASSESSMENT IN ADVANCED DEMENTIA (PAINAD)
BODYLANGUAGE: TENSE, DISTRESSED PACING, FIDGETING
FACIALEXPRESSION: SMILING OR INEXPRESSIVE
TOTALSCORE: 0
BODYLANGUAGE: TENSE, DISTRESSED PACING, FIDGETING
BREATHING: OCCASIONAL LABORED BREATHING, SHORT PERIOD OF HYPERVENTILATION
BODYLANGUAGE: RELAXED
BREATHING: NORMAL
NEGVOCALIZATION: OCCASIONAL MOAN/GROAN, LOW SPEECH, NEGATIVE/DISAPPROVING QUALITY
BREATHING: NORMAL
CONSOLABILITY: DISTRACTED OR REASSURED BY VOICE/TOUCH
TOTALSCORE: 4
CONSOLABILITY: NO NEED TO CONSOLE
FACIALEXPRESSION: SMILING OR INEXPRESSIVE
TOTALSCORE: 3
CONSOLABILITY: DISTRACTED OR REASSURED BY VOICE/TOUCH
FACIALEXPRESSION: SMILING OR INEXPRESSIVE
NEGVOCALIZATION: OCCASIONAL MOAN/GROAN, LOW SPEECH, NEGATIVE/DISAPPROVING QUALITY

## 2019-06-23 ASSESSMENT — ENCOUNTER SYMPTOMS
INSOMNIA: 1
CONSTIPATION: 1

## 2019-06-23 ASSESSMENT — LIFESTYLE VARIABLES: ALCOHOL_USE: NO

## 2019-06-23 NOTE — DISCHARGE PLANNING
Received Choice form at 2191  Agency/Facility Name: [facility], [place], [facility]  Referral sent per Choice form at 3606

## 2019-06-23 NOTE — PROGRESS NOTES
Turner hospitalist MD for order-PRN for agitation, pulling out of tubings and castillo. Awaiting for return call.

## 2019-06-23 NOTE — PROGRESS NOTES
Received report from night shift nurse. Patient lying in bed stating generalized pain. VSS at this time. Patient is alert and oriented to self only. Mittens in place per MD order. Will continue to monitor. Bed locked and in lowest position with three side rails up. Room door open and close to nurses station.

## 2019-06-23 NOTE — PROGRESS NOTES
Pt oriented to self. Castillo cath patent with yellow urine observed; IV infusing. Due meds given. Pt has mitten to both hands and still trying to pull out IV and castillo cath. Pt reoriented to environment, time, and place. Safety measures in place. Bed alarm on.

## 2019-06-23 NOTE — CARE PLAN
"Problem: Safety  Goal: Will remain free from injury  Outcome: PROGRESSING AS EXPECTED  Patient is alert and oriented to self only. Mittens in place with active order. Bed alarm on bed locked and in lowest position.     Problem: Pain Management  Goal: Pain level will decrease to patient's comfort goal  Patient states pain without a number and states that the pain is \"all over and achy.\" Rest, distraction, emotional support and pain meds given to relieve pain.       "

## 2019-06-23 NOTE — PROGRESS NOTES
Hospital Medicine Daily Progress Note    Date of Service  6/22/2019    Chief Complaint  Hip pain post GLF    Hospital Course    *89 YO female resident of [facility] w/ advanced dementia, presents w/ GLF and femoral Fx.taken to OR for nail day of admission, post op looks better but has hypotension and anemia. Patient also had UTI, POA- cx growing enterococcus. *      Interval Problem Update  Pleasantly confused this AM  Family at bedside  Patient has no awareness of injury or surgery, denies pain  Keeps pulling IVs but BP low and has UTI so needs fluids/ atb, possibly blood  Discussed DC planning scenarios and issues    Consultants/Specialty  Ortho    Code Status  DNR    Disposition  [facility] w/ HH vs SNF    Review of Systems  Review of Systems   Unable to perform ROS: Dementia        Physical Exam  Temp:  [36.7 °C (98.1 °F)] 36.7 °C (98.1 °F)  Pulse:  [] 90  Resp:  [16-18] 18  BP: ()/(40-95) 86/48  SpO2:  [89 %-99 %] 90 %    Physical Exam   Constitutional: She appears well-developed and well-nourished. No distress.   Thin and frail   HENT:   Head: Normocephalic and atraumatic.   Mouth/Throat: Oropharynx is clear and moist.   Eyes: Pupils are equal, round, and reactive to light. Conjunctivae and EOM are normal. Right eye exhibits no discharge. Left eye exhibits no discharge. Scleral icterus is present.   Neck: Neck supple.   Cardiovascular: Normal rate and regular rhythm.    Pulmonary/Chest: Effort normal and breath sounds normal.   Abdominal: Soft. Bowel sounds are normal. She exhibits no distension.   Musculoskeletal: She exhibits no edema.   Neurological: She is alert. No cranial nerve deficit.   Oriented self and to children   Skin: Skin is warm and dry. She is not diaphoretic. There is pallor.   Psychiatric:   pleasant   Nursing note and vitals reviewed.      Fluids    Intake/Output Summary (Last 24 hours) at 06/22/19 1807  Last data filed at 06/22/19 1700   Gross per 24 hour   Intake              800  ml   Output              625 ml   Net              175 ml       Laboratory  Recent Labs      06/21/19   0230  06/22/19   0703  06/22/19   1320   WBC  13.8*  7.2  8.4   RBC  4.36  2.32*  2.60*   HEMOGLOBIN  13.4  7.3*  8.0*   HEMATOCRIT  43.1  24.4*  27.4*   MCV  98.9*  105.2*  105.4*   MCH  30.7  31.5  30.8   MCHC  31.1*  29.9*  29.2*   RDW  43.6  48.3  47.9   PLATELETCT  201  120*  140*   MPV  8.7*  9.1  9.2     Recent Labs      06/21/19   0230  06/21/19   0907  06/22/19   0500   SODIUM  138  137  140   POTASSIUM  4.3  4.0  4.0   CHLORIDE  103  104  112   CO2  27  27  23   GLUCOSE  162*  128*  116*   BUN  17  16  18   CREATININE  0.71  0.58  0.82   CALCIUM  9.0  8.1*  7.4*     Recent Labs      06/21/19   0230   APTT  26.6   INR  1.11               Imaging  DX-PORTABLE FLUORO > 1 HOUR   Final Result         Portable fluoroscopy utilized for 84.2 seconds.      DX-HIP-UNILATERAL-WITHOUT PELVIS-1 VIEW LEFT   Final Result      Femoral fracture fixation      DX-FEMUR-1 VIEW LEFT   Final Result         1.  Impacted left femoral neck fracture.   2.  Coarse calcification in the distal femoral diaphysis suggests bony infarct.   3.  Atherosclerosis      DX-CHEST-LIMITED (1 VIEW)   Final Result         1.  No acute cardiopulmonary disease.   2.  Atherosclerosis   3.  Perihilar interstitial prominence and bronchial wall cuffing, appearance suggests changes of underlying bronchial inflammation, consider bronchitis.           Assessment/Plan  * Closed left hip fracture (HCC)   Assessment & Plan    -Status post ground-level fall.  IM nail 6/21     Preoperative cardiovascular examination   Assessment & Plan    Family is aware of her risk     Anemia associated with acute blood loss   Assessment & Plan    Significant drop overnight  Now seems to have leveled off  CTM  xfuse below 7  Discussed w/RN     Acute UTI   Assessment & Plan    Enterococcus  PCN allergy?  Start Vanco     Elevated glucose   Assessment & Plan    Mildly elevated-  will not restrict diet or subject demented lady to FBGs     Dementia without behavioral disturbance- (present on admission)   Assessment & Plan    No behavior issues except repeated removal of IV, castillo- had to order mittens  Needs IV 2/2 hypotension     GERD (gastroesophageal reflux disease)- (present on admission)   Assessment & Plan    -May need medication PRN.          VTE prophylaxis: SCDs (anemia)

## 2019-06-23 NOTE — DISCHARGE PLANNING
Anticipated Discharge Disposition: SNF    Action: Received completed SNF choice form from pt's son.   Choice form completed and faxed to [facility]. Family also wants to verify SNF is contracted with pt's secondary insurance.     Barriers to Discharge: SNF acceptance    Plan: F/U with SNF's    PASRR submitted #[id number]       Care Transition Team Assessment    Information Source  Orientation : Disoriented to Event, Disoriented to Place, Disoriented to Time  Information Given By: Relative         Elopement Risk  Legal Hold: No  Ambulatory or Self Mobile in Wheelchair: No-Not an Elopement Risk  Elopement Risk: Not at Risk for Elopement    Interdisciplinary Discharge Planning  Lives with - Patient's Self Care Capacity: Alone and Unable to Care For Self  Patient or legal guardian wants to designate a caregiver (see row info): No  Housing / Facility: Assisted Living Residence (memory care)  Prior Services: Aging Services    Discharge Preparedness  What is your plan after discharge?: Skilled nursing facility  What are your discharge supports?: Child  Prior Functional Level: Needs Assist with Activities of Daily Living, Needs Assist with Medication Management    Functional Assesment  Prior Functional Level: Needs Assist with Activities of Daily Living, Needs Assist with Medication Management    Finances  Financial Barriers to Discharge: No  Prescription Coverage: Yes                   Domestic Abuse  Have you ever been the victim of abuse or violence?: No    Psychological Assessment  History of Substance Abuse: None  History of Psychiatric Problems: No  Non-compliant with Treatment: No  Newly Diagnosed Illness: Yes    Discharge Risks or Barriers  Discharge risks or barriers?: No    Anticipated Discharge Information  Anticipated discharge disposition: HHC, SNF

## 2019-06-23 NOTE — PROGRESS NOTES
Hospital Medicine Daily Progress Note    Date of Service  6/23/2019    Chief Complaint  Hip pain post GLF    Hospital Course    *89 YO female resident of [facility] w/ advanced dementia, presents w/ GLF and femoral Fx.taken to OR for nail day of admission, post op looks better but has hypotension and anemia. Patient also had UTI, POA- cx growing enterococcus. *      Interval Problem Update  More irritable this morning and does not follow commands very well  She was up all night per nursing, however mittens have allowed her to maintain her IVs.  Patient is complaining of chest pain and she is starting to desaturate.  She has not had a bowel movement and is not eating well  Is noted to be pale    Consultants/Specialty  Ortho    Code Status  DNR    Disposition  [facility] w[facility] vs SNF    Review of Systems  Review of Systems   Unable to perform ROS: Dementia   Cardiovascular: Positive for chest pain.   Gastrointestinal: Positive for constipation.        Reduced oral intake per nursing   Psychiatric/Behavioral: The patient has insomnia (Out most of night per nursing).         Physical Exam  Temp:  [36.4 °C (97.6 °F)-36.9 °C (98.5 °F)] 36.7 °C (98.1 °F)  Pulse:  [] 91  Resp:  [18-22] 20  BP: ()/(48-90) 118/76  SpO2:  [90 %-95 %] 91 %    Physical Exam   Constitutional: She appears well-developed and well-nourished. No distress.   Thin and frail   HENT:   Head: Normocephalic and atraumatic.   Mouth/Throat: Oropharynx is clear and moist.   Eyes: Pupils are equal, round, and reactive to light. Conjunctivae and EOM are normal. Right eye exhibits no discharge. Left eye exhibits no discharge.   Neck: Neck supple.   Cardiovascular: Normal rate and regular rhythm.    Pulmonary/Chest: Effort normal and breath sounds normal.   Clear anteriorly   Abdominal: Soft. She exhibits no distension (Gassy bloating). There is tenderness.   Decreased bowel sounds   Musculoskeletal: She exhibits no edema.   Neurological: She is alert. No  cranial nerve deficit.   Skin: Skin is warm and dry. She is not diaphoretic. There is pallor.   Psychiatric:   More agitated and fussy   Nursing note and vitals reviewed.      Fluids    Intake/Output Summary (Last 24 hours) at 06/23/19 1400  Last data filed at 06/23/19 1244   Gross per 24 hour   Intake          3310.42 ml   Output             1025 ml   Net          2285.42 ml       Laboratory  Recent Labs      06/22/19   0703  06/22/19   1320  06/23/19   0441  06/23/19   1301   WBC  7.2  8.4  9.5   --    RBC  2.32*  2.60*  2.11*   --    HEMOGLOBIN  7.3*  8.0*  6.6*  8.6*   HEMATOCRIT  24.4*  27.4*  21.6*  27.3*   MCV  105.2*  105.4*  102.4*   --    MCH  31.5  30.8  31.3   --    MCHC  29.9*  29.2*  30.6*   --    RDW  48.3  47.9  47.5   --    PLATELETCT  120*  140*  121*   --    MPV  9.1  9.2  9.3   --      Recent Labs      06/21/19   0907  06/22/19   0500  06/23/19   0441   SODIUM  137  140  141   POTASSIUM  4.0  4.0  3.9   CHLORIDE  104  112  112   CO2  27  23  20   GLUCOSE  128*  116*  141*   BUN  16  18  25*   CREATININE  0.58  0.82  0.64   CALCIUM  8.1*  7.4*  7.4*     Recent Labs      06/21/19   0230   APTT  26.6   INR  1.11     Recent Labs      06/23/19   1301   BNPBTYPENAT  312*           Imaging  DX-CHEST-PORTABLE (1 VIEW)   Final Result      Increasing basilar atelectasis      DX-PORTABLE FLUORO > 1 HOUR   Final Result         Portable fluoroscopy utilized for 84.2 seconds.      DX-HIP-UNILATERAL-WITHOUT PELVIS-1 VIEW LEFT   Final Result      Femoral fracture fixation      DX-FEMUR-1 VIEW LEFT   Final Result         1.  Impacted left femoral neck fracture.   2.  Coarse calcification in the distal femoral diaphysis suggests bony infarct.   3.  Atherosclerosis      DX-CHEST-LIMITED (1 VIEW)   Final Result         1.  No acute cardiopulmonary disease.   2.  Atherosclerosis   3.  Perihilar interstitial prominence and bronchial wall cuffing, appearance suggests changes of underlying bronchial inflammation,  consider bronchitis.           Assessment/Plan  * Closed left hip fracture (HCC)   Assessment & Plan    -Status post ground-level fall.  IM nail 6/21  Acute blood loss secondary to trauma versus surgery, moderate swelling in the leg however nothing overt     Preoperative cardiovascular examination   Assessment & Plan    Family is aware of her risk  Today patient complains of chest pain but EKG reveals no ischemia, follow-up troponin will be obtained 6 hours after onset of chest pain     Acute respiratory failure with hypoxia (HCC)   Assessment & Plan    Chest x-ray reviewed she has volume overload likely secondary to aggressive IV fluid resuscitation due to postop hypotension  Blood pressures are somewhat better and we are transfusing today hopefully week she will not require further IV volume administration.  If her blood pressure improves we can probably diurese her a bit.     Chest pain   Assessment & Plan    Versus shortness of breath, plan as noted     Constipated   Assessment & Plan    Patient has not had bowel movement since prior to admission, discussed with RN, may need suppository today her oral intake is dwindling     Anemia associated with acute blood loss   Assessment & Plan    1 unit PRBCs ordered today, CBC tomorrow  As stated no obvious hematoma at this time but dressing over wound is rather large     Acute UTI   Assessment & Plan    Enterococcus  On vancomycin for now     Elevated glucose   Assessment & Plan    Mildly elevated- will not restrict diet or subject demented lady to FBGs     Dementia without behavioral disturbance- (present on admission)   Assessment & Plan    No behavior issues except repeated removal of IV, castillo- had to order mittens  Needs IV 2/2 hypotension, anemia     GERD (gastroesophageal reflux disease)- (present on admission)   Assessment & Plan    -May need medication PRN.          VTE prophylaxis: SCDs (anemia)

## 2019-06-23 NOTE — PROGRESS NOTES
Patient SCD's turned off due to patient stating pain. Will continue to monitor lower extremities for changes in sensation and circulation.

## 2019-06-23 NOTE — ASSESSMENT & PLAN NOTE
Chest x-ray reviewed she has volume overload likely secondary to aggressive IV fluid resuscitation due to postop hypotension  Blood pressures are somewhat better and we are transfusing today hopefully week she will not require further IV volume administration.  If her blood pressure improves we can probably diurese her a bit.

## 2019-06-23 NOTE — ASSESSMENT & PLAN NOTE
Patient has not had bowel movement since prior to admission, discussed with RN, may need suppository today her oral intake is dwindling

## 2019-06-23 NOTE — PROGRESS NOTES
"Pharmacy Kinetics 89 y.o. female on vancomycin day # 2 2019    Currently on Vancomycin 600 mg iv q24hr    Indication for Treatment: UTI     Pertinent history per medical record: Admitted on 2019 for Displaced fracture of base of neck of left femur, initial encuonter for closed fracture.     Other antibiotics: None     Allergies: Boniva [ibandronate sodium]; Doxycycline; Evista [raloxifene hydrochloride]; Fosamax; Lovastatin; Penicillins; and Sulfa drugs      List concerns for renal function:  Age 89     Pertinent cultures to date:    Urine Group D Enterococcus     MRSA nares swab if pneumonia is a concern (ordered/positive/negative/n-a): N/A    Recent Labs      19   0230  19   0703  19   1320  19   0441   WBC  13.8*  7.2  8.4  9.5   NEUTSPOLYS  84.30*  73.90*   --   73.70*     Recent Labs      19   0230  19   0907  19   0500  19   0441   BUN  17  16  18  25*   CREATININE  0.71  0.58  0.82  0.64   ALBUMIN  3.6   --    --    --      No results for input(s): VANCOTROUGH, VANCOPEAK, VANCORANDOM in the last 72 hours.  Intake/Output Summary (Last 24 hours) at 19 1356  Last data filed at 19 1244   Gross per 24 hour   Intake          3610.42 ml   Output             1025 ml   Net          2585.42 ml      /76   Pulse 91   Temp 36.7 °C (98.1 °F) (Oral)   Resp 20   Ht 1.499 m (4' 11\")   Wt 39 kg (85 lb 15.7 oz)   SpO2 91%  Temp (24hrs), Av.7 °C (98.1 °F), Min:36.4 °C (97.6 °F), Max:36.9 °C (98.5 °F)      A/P   1. Vancomycin dose change: No change today  2. Next vancomycin level:  12:30  3. Goal trough: 12-16 mcg/ ml  4. Comments: Will continue to monitor and adjust dose as needed per protocol.    Valery Spencer  "

## 2019-06-23 NOTE — PROGRESS NOTES
Spoke with hospitalist regarding Hgb of 6.6 this AM, received orders for blood transfusion.  Called and left message for patient's daughter for 2 RN consent over the phone.  Called the patient's son and he agreed to the transfusion, confirmed with RN/Coco.  Consent signed and placed in chart.

## 2019-06-23 NOTE — ASSESSMENT & PLAN NOTE
1 unit PRBCs ordered today, CBC tomorrow  As stated no obvious hematoma at this time but dressing over wound is rather large

## 2019-06-24 VITALS
BODY MASS INDEX: 17.33 KG/M2 | OXYGEN SATURATION: 91 % | DIASTOLIC BLOOD PRESSURE: 54 MMHG | RESPIRATION RATE: 18 BRPM | HEART RATE: 98 BPM | HEIGHT: 59 IN | WEIGHT: 85.98 LBS | SYSTOLIC BLOOD PRESSURE: 90 MMHG | TEMPERATURE: 98 F

## 2019-06-24 LAB
ERYTHROCYTE [DISTWIDTH] IN BLOOD BY AUTOMATED COUNT: 52.6 FL (ref 35.9–50)
HCT VFR BLD AUTO: 27.4 % (ref 37–47)
HGB BLD-MCNC: 8.6 G/DL (ref 12–16)
MCH RBC QN AUTO: 31 PG (ref 27–33)
MCHC RBC AUTO-ENTMCNC: 31.4 G/DL (ref 33.6–35)
MCV RBC AUTO: 98.9 FL (ref 81.4–97.8)
PLATELET # BLD AUTO: 123 K/UL (ref 164–446)
PMV BLD AUTO: 9.2 FL (ref 9–12.9)
RBC # BLD AUTO: 2.77 M/UL (ref 4.2–5.4)
WBC # BLD AUTO: 9.3 K/UL (ref 4.8–10.8)

## 2019-06-24 PROCEDURE — A9270 NON-COVERED ITEM OR SERVICE: HCPCS | Performed by: ORTHOPAEDIC SURGERY

## 2019-06-24 PROCEDURE — 700111 HCHG RX REV CODE 636 W/ 250 OVERRIDE (IP): Performed by: ORTHOPAEDIC SURGERY

## 2019-06-24 PROCEDURE — A9270 NON-COVERED ITEM OR SERVICE: HCPCS | Performed by: INTERNAL MEDICINE

## 2019-06-24 PROCEDURE — 700112 HCHG RX REV CODE 229: Performed by: ORTHOPAEDIC SURGERY

## 2019-06-24 PROCEDURE — 85027 COMPLETE CBC AUTOMATED: CPT

## 2019-06-24 PROCEDURE — 36415 COLL VENOUS BLD VENIPUNCTURE: CPT

## 2019-06-24 PROCEDURE — 99239 HOSP IP/OBS DSCHRG MGMT >30: CPT | Performed by: INTERNAL MEDICINE

## 2019-06-24 PROCEDURE — 700102 HCHG RX REV CODE 250 W/ 637 OVERRIDE(OP): Performed by: INTERNAL MEDICINE

## 2019-06-24 PROCEDURE — 700102 HCHG RX REV CODE 250 W/ 637 OVERRIDE(OP): Performed by: ORTHOPAEDIC SURGERY

## 2019-06-24 RX ORDER — TRAMADOL HYDROCHLORIDE 50 MG/1
25 TABLET ORAL EVERY 6 HOURS PRN
Qty: 6 TAB | Refills: 0 | Status: SHIPPED | OUTPATIENT
Start: 2019-06-24 | End: 2019-06-27

## 2019-06-24 RX ORDER — CEPHALEXIN 250 MG/1
250 CAPSULE ORAL EVERY 6 HOURS
Qty: 12 CAP | Refills: 0 | Status: SHIPPED | OUTPATIENT
Start: 2019-06-24

## 2019-06-24 RX ORDER — SENNOSIDES 8.6 MG
2 TABLET ORAL
Qty: 14 TAB
Start: 2019-06-24

## 2019-06-24 RX ORDER — POLYETHYLENE GLYCOL 3350 17 G/17G
17 POWDER, FOR SOLUTION ORAL DAILY
Start: 2019-06-24

## 2019-06-24 RX ORDER — ACETAMINOPHEN 500 MG
1000 TABLET ORAL EVERY 6 HOURS PRN
Start: 2019-06-24

## 2019-06-24 RX ORDER — CEPHALEXIN 250 MG/1
250 CAPSULE ORAL EVERY 6 HOURS
Status: DISCONTINUED | OUTPATIENT
Start: 2019-06-24 | End: 2019-06-24 | Stop reason: HOSPADM

## 2019-06-24 RX ADMIN — CEPHALEXIN 250 MG: 250 CAPSULE ORAL at 12:10

## 2019-06-24 RX ADMIN — KETOROLAC TROMETHAMINE 15 MG: 30 INJECTION, SOLUTION INTRAMUSCULAR; INTRAVENOUS at 05:37

## 2019-06-24 RX ADMIN — ACETAMINOPHEN 1000 MG: 500 TABLET, FILM COATED ORAL at 12:10

## 2019-06-24 RX ADMIN — BISACODYL 10 MG: 10 SUPPOSITORY RECTAL at 12:10

## 2019-06-24 RX ADMIN — KETOROLAC TROMETHAMINE 15 MG: 30 INJECTION, SOLUTION INTRAMUSCULAR; INTRAVENOUS at 12:10

## 2019-06-24 RX ADMIN — CEPHALEXIN 250 MG: 250 CAPSULE ORAL at 08:41

## 2019-06-24 RX ADMIN — DOCUSATE SODIUM 100 MG: 100 CAPSULE, LIQUID FILLED ORAL at 06:16

## 2019-06-24 ASSESSMENT — PAIN SCALES - PAIN ASSESSMENT IN ADVANCED DEMENTIA (PAINAD)
BREATHING: NORMAL
BODYLANGUAGE: RELAXED
BREATHING: NORMAL
BODYLANGUAGE: RELAXED
CONSOLABILITY: NO NEED TO CONSOLE
FACIALEXPRESSION: SMILING OR INEXPRESSIVE
TOTALSCORE: 0
FACIALEXPRESSION: SMILING OR INEXPRESSIVE
FACIALEXPRESSION: SMILING OR INEXPRESSIVE
BODYLANGUAGE: RELAXED
TOTALSCORE: 1
TOTALSCORE: 1
CONSOLABILITY: DISTRACTED OR REASSURED BY VOICE/TOUCH
CONSOLABILITY: NO NEED TO CONSOLE
BREATHING: NORMAL
TOTALSCORE: 0
CONSOLABILITY: DISTRACTED OR REASSURED BY VOICE/TOUCH
BREATHING: NORMAL
FACIALEXPRESSION: SMILING OR INEXPRESSIVE
BODYLANGUAGE: RELAXED

## 2019-06-24 ASSESSMENT — PATIENT HEALTH QUESTIONNAIRE - PHQ9
1. LITTLE INTEREST OR PLEASURE IN DOING THINGS: NOT AT ALL
SUM OF ALL RESPONSES TO PHQ9 QUESTIONS 1 AND 2: 0
2. FEELING DOWN, DEPRESSED, IRRITABLE, OR HOPELESS: NOT AT ALL

## 2019-06-24 NOTE — PROGRESS NOTES
"S: Up in chair. Wants to go to bed, but really has no specific complaints.     O: Dressing dry and intact. Wiggles toes.    BP (!) 90/54   Pulse 98   Temp 36.7 °C (98 °F) (Oral)   Resp 18   Ht 1.499 m (4' 11\")   Wt 39 kg (85 lb 15.7 oz)   SpO2 91%   Recent Labs      06/22/19   1320  06/23/19   0441  06/23/19   1301  06/24/19   0459   WBC  8.4  9.5   --   9.3   RBC  2.60*  2.11*   --   2.77*   HEMOGLOBIN  8.0*  6.6*  8.6*  8.6*   HEMATOCRIT  27.4*  21.6*  27.3*  27.4*   MCV  105.4*  102.4*   --   98.9*   MCH  30.8  31.3   --   31.0   MCHC  29.2*  30.6*   --   31.4*   RDW  47.9  47.5   --   52.6*   PLATELETCT  140*  121*   --   123*   MPV  9.2  9.3   --   9.2     Recent Labs      06/22/19   1320  06/23/19   0441  06/23/19   1301  06/24/19   0459   RBC  2.60*  2.11*   --   2.77*   HEMOGLOBIN  8.0*  6.6*  8.6*  8.6*   HEMATOCRIT  27.4*  21.6*  27.3*  27.4*   PLATELETCT  140*  121*   --   123*     Recent Labs      06/22/19   0500  06/23/19   0441   SODIUM  140  141   POTASSIUM  4.0  3.9   CHLORIDE  112  112   CO2  23  20   GLUCOSE  116*  141*   BUN  18  25*   CREATININE  0.82  0.64   CALCIUM  7.4*  7.4*           A: Stable POD #3 from Ortho standpoint     P: Pending placement. I should see her back in office ~3 wks post op, sooner should issues arise.     "

## 2019-06-24 NOTE — PROGRESS NOTES
Received report from night shift RN. Assumed care of patient. Patient is currently sleeping with no complaints. VSS. Will continue to monitor. Bed locked and in lowest position. Call light within reach and room door open and close to nurse's station.

## 2019-06-24 NOTE — DISCHARGE PLANNING
Patient discussed in afternoon rounds.  FELIPE updated that patient is medically cleared to discharge to SNF.  FELIPE called patient's son, Daryl and updated him that his mother would be transfer today.  Satish in agreement with transfer.  FELIPE will complete COBRA and transfer packet and provide to bedside RN.

## 2019-06-24 NOTE — CARE PLAN
Problem: Nutritional:  Goal: Achieve adequate nutritional intake  Advance diet as tolerated.  Once diet advanced, patient will consume 50% of meals   Outcome: PROGRESSING SLOWER THAN EXPECTED  Diet advanced to Regular including cereal and milk with all meals.  Recorded PO intake varies from 0% to %.

## 2019-06-24 NOTE — PROGRESS NOTES
Assumed care of patient at 1900.  Patient is alert and oriented to person.  Mitts remain to bilat hands to keep patient from pulling lines.  Patient appears to be in no distress and remains on Q2H turn schedule.  Patient is incontinent of urine.  PIV remains saline locked.

## 2019-06-24 NOTE — DISCHARGE SUMMARY
Discharge Summary    CHIEF COMPLAINT ON ADMISSION  Chief Complaint   Patient presents with   • T-5000 FALL     pt fell of the toilet today and landed on her left side       Reason for Admission  Fall     CODE STATUS  DNAR/DNI    HPI & HOSPITAL COURSE    *89 YO female resident of [facility] w/ advanced dementia, presents w/ GLF and femoral Fx.taken to OR for nail day of admission, post op looks better but has hypotension and anemia. Patient also had UTI, POA- cx growing enterococcus. * However culture eventually viridans strep she received vancomycin for 48 hours and will be discharged on Keflex for additional 3 days.  Patient has been pleasantly demented throughout her stay, she underwent nail on the date of admission and by the following day had forgotten she had a hip fracture, she developed hypotension postop as well as anemia which stabilized with IV fluids and transfusion of 1 unit of packed red cells.  She has been unable to maintain 50% partial weightbearing on the left leg and has required considerable assistance when up with a walker and is therefore not yet safe for return to [facility].  She will first need to go to skilled nursing for further rehab.\    Of note patient did have significant constipation during her stay which resolved with MiraLAX given the day prior to discharge.    Therefore, she is discharged in good and stable condition to skilled nursing facility.    The patient met 2-midnight criteria for an inpatient stay at the time of discharge.      FOLLOW UP ITEMS POST DISCHARGE  PCP  Orthopaedics    DISCHARGE DIAGNOSES  Principal Problem:    Closed left hip fracture (HCC) POA: Unknown  Active Problems:    Preoperative cardiovascular examination POA: Unknown    GERD (gastroesophageal reflux disease) (Chronic) POA: Yes      Overview: 8/3/12 stop prilosec due to osteopenia and vertebral compression fractures    Dementia without behavioral disturbance (Chronic) POA: Yes      Overview: 2/18/16  MMSE 20/30 labs and CT pending, if negative consider aricept      2/19/16 hgb 7.0,hct 25,mcv 77,b12 36,folate >24,ESR 15, cmp, tsh normal      7/26/16 start aricept 5 mg      8/16/16 increase to aricept 10 mg      8/14/17 not taking aricept nausea, 1/3 screening test      9/28/17 alert to year, city of residence, self      2/14/19 MMSE 16/30          Elevated glucose POA: Unknown    Acute UTI POA: Unknown    Anemia associated with acute blood loss POA: Unknown    Constipated POA: Unknown    Chest pain POA: Unknown    Acute respiratory failure with hypoxia (HCC) POA: Unknown  Resolved Problems:    * No resolved hospital problems. *      FOLLOW UP  No future appointments.  No follow-up provider specified.    MEDICATIONS ON DISCHARGE     Medication List      START taking these medications      Instructions   acetaminophen 500 MG Tabs  Commonly known as:  TYLENOL   Take 2 Tabs by mouth every 6 hours as needed for Mild Pain, Moderate Pain or Fever.  Dose:  1000 mg     cephALEXin 250 MG Caps  Commonly known as:  KEFLEX   Take 1 Cap by mouth every 6 hours.  Dose:  250 mg     polyethylene glycol/lytes Pack  Commonly known as:  MIRALAX   Take 1 Packet by mouth every day.  Dose:  17 g     Senna 8.6 MG Tabs   Take 2 Tabs by mouth every bedtime.  Dose:  2 Tab     tramadol 50 MG Tabs  Commonly known as:  ULTRAM   Take 0.5 Tabs by mouth every 6 hours as needed for up to 3 days.  Dose:  25 mg        CHANGE how you take these medications      Instructions   Cholecalciferol 2000 UNIT Caps  What changed:  Another medication with the same name was removed. Continue taking this medication, and follow the directions you see here.   Take 1 Cap by mouth every day.  Dose:  2000 Units     ferrous sulfate 220 (44 Fe) MG/5ML Elix  What changed:  when to take this  Commonly known as:  FEOSOL   Take 5 mL by mouth 3 times a day.  Dose:  220 mg        CONTINUE taking these medications      Instructions   Calcium 150 MG Tabs   Take  by mouth.    "  MULTI-VITAMIN DAILY PO   Take  by mouth.        STOP taking these medications    ALEVE PO     lidocaine 5 % Ptch  Commonly known as:  LIDODERM            Allergies  Allergies   Allergen Reactions   • Boniva [Ibandronate Sodium]      Pt denies   • Doxycycline      Pt denies   • Evista [Raloxifene Hydrochloride]      Pt denies   • Fosamax      Pt denies   • Lovastatin      Pt denies   • Penicillins      \"possibly\" reaction unk  Pt denies   • Sulfa Drugs      Pt denies       DIET  Orders Placed This Encounter   Procedures   • Diet Order Regular (Pt would like Cheerio's and milk with every meal)     Standing Status:   Standing     Number of Occurrences:   1     Order Specific Question:   Diet:     Answer:   Regular [1]     Comments:   Pt would like Cheerio's and milk with every meal   • Discontinue Diet Tray     Standing Status:   Standing     Number of Occurrences:   1       ACTIVITY  50% WB on LLE    LINES, DRAINS, AND WOUNDS  This is an automated list. Peripheral IVs will be removed prior to discharge.                     MENTAL STATUS ON TRANSFER  Level of Consciousness: Confused  Orientation : Disoriented to Event, Disoriented to Place, Disoriented to Time  Speech: Speech Clear    CONSULTATIONS  Orthopaedics- Freddy    PROCEDURES  PreOp Diagnosis: Left proximal femur fracture     PostOp Diagnosis: Left basicervical femoral neck fracture     Procedure(s): Left Intertan IM nail of basicervical femoral neck fracture     Surgeon(s):  Wm David Hayes M.D.     Anesthesiologist/Type of Anesthesia:  Anesthesiologist: Chris Pro M.D./General     Surgical Staff:  Circulator: Denis Nair R.N.  Scrub Person: Neville Mccarty  Radiology Technologist: Ximena Silveira     Specimens removed if any:  * No specimens in log *       LABORATORY  Lab Results   Component Value Date    SODIUM 141 06/23/2019    POTASSIUM 3.9 06/23/2019    CHLORIDE 112 06/23/2019    CO2 20 06/23/2019    GLUCOSE 141 (H) 06/23/2019    BUN 25 (H) " 06/23/2019    CREATININE 0.64 06/23/2019    CREATININE 0.6 03/19/2009        Lab Results   Component Value Date    WBC 9.3 06/24/2019    HEMOGLOBIN 8.6 (L) 06/24/2019    HEMATOCRIT 27.4 (L) 06/24/2019    PLATELETCT 123 (L) 06/24/2019        Total time of the discharge process exceeds 39 minutes.

## 2019-06-24 NOTE — DISCHARGE INSTRUCTIONS
Discharge Instructions    Discharged to other by medical transportation with escort. Discharged via wheelchair, hospital escort: Yes.  Special equipment needed: Walker    Be sure to schedule a follow-up appointment with your primary care doctor or any specialists as instructed.     Discharge Plan:   Diet Plan: Discussed  Activity Level: Discussed  Confirmed Follow up Appointment: Patient to Call and Schedule Appointment  Confirmed Symptoms Management: Discussed  Medication Reconciliation Updated: Yes  Pneumococcal Vaccine Administered/Refused: Not given - Patient refused pneumococcal vaccine  Influenza Vaccine Indication: Not indicated: Previously immunized this influenza season and > 8 years of age    I understand that a diet low in cholesterol, fat, and sodium is recommended for good health. Unless I have been given specific instructions below for another diet, I accept this instruction as my diet prescription.   Other diet: Regular    Special Instructions: Discharge instructions for the Orthopedic Patient    Follow up with Primary Care Physician within 2 weeks of discharge to home, regarding:  Review of medications and diagnostic testing.  Surveillance for medical complications.  Workup and treatment of osteoporosis, if appropriate.     -Is this a Joint Replacement patient? Yes   Total Joint Hip Replacement Discharge Instructions    Pain  - The goal is to slowly wean off the prescription pain medicine.  - Ice can be used for pain control.  20 minutes at a time is recommended, and never directly against your skin or incision.  - Most patients are off the pain pills by 3 weeks; others may require a low level of pain medications for many months. If your pain continues to be severe, follow up with your physician.  Infection  Deep hip joint infections that require removal of the prostheses occur in less than 0.1% of patients. Lesser infections in the skin (cellulites) are more common and much more easily  treated.  - Keep the incision as clean and dry as possible.  - Always wash your hands before touching your incision.  - Skin infections tend to develop around 7-10 days after surgery, most can be treated with oral antibiotics.  - Dental Care should be delayed for 3 months after surgery, your surgeon recommends taking a dose of antibiotics 1 hour prior to any dental procedure.  After 2 years, most surgeons recommend antibiotics only before an extensive procedure.  Ask your surgeon what he recommends.  - Signs and symptoms of infection can include:  low grade fever, redness, pain, swelling and drainage from your incision.  Notify your surgeon immediately if you develop any of these symptoms.  Post op Disturbances  - Bowel habits - constipation is extremely common and is caused by a combination of anesthesia, lack of mobility and pain medicine.  Use stool softeners or laxatives if necessary. It is important not to ignore this problem, as bowel obstructions can be a serious complication after joint replacement surgery.  - Mood/Energy Level - Many patients experience a lack of energy and endurance for up to 2-3 months after surgery.  Some may also feel down and can even become depressed.  This is likely due to the postoperative anemia, change in activity level, lack of sleep, pain medicine and just the emotional reaction to the surgery itself that is a big disruption in a person’s life.  This usually passes.  If symptoms persist, follow up with your primary physician.  - Returning to work - Your surgeon will give you more specific instructions.  Generally, if you work a sedentary job requiring little standing or walking, most patients may return within 2-6 weeks.  Manual labor jobs involving walking, lifting and standing may take 3-4 months.  Your surgeon’s office can provide a release to part-time or light duty work early on in your recovery and progress you to full duty as able.  - Driving - You can begin driving an  automatic shift car in 4 to 8 weeks, provided you are no longer taking narcotic pain medication. If you have a stick-shift car and your right hip was replaced, do not begin driving until your doctor says you can.   - Avoiding falls -  throw rugs and tack down loose carpeting.  Be aware of floor hazards such as pets, small objects or uneven surfaces.   -  Airport Metal Detectors - The sensitivity of metal detectors varies and it is likely that your prosthesis will cause an alarm. Inform the  that you have an artificial joint.  Diet  - Resume your normal diet as tolerated.  - It is important to achieve a healthy nutritional status by eating a well balanced diet on a regular basis.  - Your physician may recommend that you take iron and vitamin supplements.   - Continue to drink plenty of fluids.  Shower/Bathing  - You may shower as soon as you get home from the hospital unless otherwise instructed.  - Keep your incision out of water.  To keep the incision dry when showering, cover it with a plastic bag or plastic wrap.  - Pat incision dry if it gets wet.  Don’t rub.  - Do not submerge in a bath until staples are out and the incision is completely healed. (Approximately 6-8 weeks after surgery).  Dressing Change:  Procedure (if recommended by your physician)  - Wash hands.  - Open all dressing change materials.  - Remove old dressing and discard.  - Inspect incision for redness, increase in clear drainage, yellow/green drainage, odor and surrounding skin hot to touch.  -  ABD (large gauze) pad by one corner and lay over the incision.  Be careful not to touch the inside of the dressing that will lay over the incision.  - Secure in place as instructed (Ace wrap or tape).    Swelling/Bruising  - Swelling is normal after hip replacement and can involve the thigh, knee, calf and foot.  - Swelling can last from 3-6 months.  - Elevate your leg higher than your heart while reclining.  The first  week you are home you should elevate your leg an equal amount of time, as you are active.    - Anti-inflammatory pills can be taken once you have stopped the blood thinners.  - The swelling is usually worse after you go home since you are upright for longer periods of time.  - Bruising is common and can involve the entire leg including the thigh, calf and even foot.  Bruising often does not appear until after you arrive home and it can be quite dramatic- purple, black, green.  The bruising you can see is not usually concerning and will subside without any treatment.      Blood Clot Prevention  Blood clots in the legs and the less common, but frightening, clots that travel to the lungs are a real focus of our preventative. Most patients are at standard risk for them, but those patients who are at higher risk include people who have had previous clots, a family history of clotting, smoking, diabetes, obesity, advanced age, use of estrogen and a sedentary lifestyle.    - Signs of blood clots in legs - Swelling in thigh, calf or ankle that does not go down with elevation.  Pain, heat and tenderness in calf, back of calf or groin area.  NOTE: blood clots can occur in either leg.  - You have been receiving anticoagulant therapy (blood thinners) in the hospital and you may be instructed to continue at home depending on your risk factors.  - Your risk for developing a clot continues for up to 2-3 months after surgery.  You should avoid prolonged sitting and dehydration during that time (long air trips and car trips).  If you do take a trip during this time, please get up and move around every 1- 1.5 hours.  - If you are prescribed blood thinning medication for home, follow instructions as directed. (Handouts provided if applicable).      Activity    Once you get home, you should stay active. The key is not to overdo it! While you can expect some good days and some bad days, you should notice a gradual improvement over time  you should notice a gradual improvement and a gradual increase in your endurance over the next 6 to 12 months.    - Weight Bearing - If you have undergone cemented or hybrid hip replacement, you can put some weight on the leg immediately using a cane or walker, and you should continue to use some support for 4 to 6 weeks to help the muscles recover.   - Sleeping Positions - Sleep on your back with your legs slightly apart or on your side with a regular pillow between your knees. Be sure to use the pillow for at least 6 weeks, or until your doctor says you can do without it. Sleeping on your stomach should be all right  - Sitting - For at least the first 3 months, sit only in chairs that have arms. Do not sit on low chairs, low stools, or reclining chairs. Do not cross your legs at the knees. The physical therapist will show you how to sit and stand from a chair, keeping your affected leg out in front of you. Get up and move around on a regular basis--at least once every hour.  - Walking - Walk as much as you like once your doctor gives you the go-ahead, but remember that walking is no substitute for your prescribed exercises. Walking with a pair of trekking poles is helpful and adds as much as 40% to the exercise you get when you walk  - Therapy may be needed in some cases, to strengthen your muscles and improve your gait (walking pattern).  This decision will be made at your post-operative appointment.  Follow your therapist recommended post-operative exercises (handout provided by Therapist).  - Swimming is also recommended; you can begin as soon as the sutures have been removed and the wound is healed, approximately 6 to 8 weeks after surgery. Using a pair of training fins may make swimming a more enjoyable and effective exercise.  - Other activities - Lower impact activities are preferred.  If you have specific questions, consult your Surgeon.    - Sexual activity - Your surgeon can tell you when it should be  safe to resume sexual activity.      When to Call the Doctor   Call the physician if:   - Fever over 100.5? F  - Increased pain, drainage, redness, odor or heat around the incision area  - Shaking chills  - Increased knee pain with activity and rest  - Increased pain in calf, tenderness or redness above or below the knee  - Increased swelling of calf, ankle, foot  - Sudden increased shortness of breath, sudden onset of chest pain, localized chest pain with coughing  - Incision opening  Or, if there are any questions or concerns about medications or care.       -Is this patient being discharged with medication to prevent blood clots?  Yes, Xarelto Rivaroxaban oral tablets  What is this medicine?  RIVAROXABAN (ri va KEMI a ban) is an anticoagulant (blood thinner). It is used to treat blood clots in the lungs or in the veins. It is also used after knee or hip surgeries to prevent blood clots. It is also used to lower the chance of stroke in people with a medical condition called atrial fibrillation.  This medicine may be used for other purposes; ask your health care provider or pharmacist if you have questions.  COMMON BRAND NAME(S): Xarelto, Xarelto Starter Pack  What should I tell my health care provider before I take this medicine?  They need to know if you have any of these conditions:  -bleeding disorders  -bleeding in the brain  -blood in your stools (black or tarry stools) or if you have blood in your vomit  -history of stomach bleeding  -kidney disease  -liver disease  -low blood counts, like low white cell, platelet, or red cell counts  -recent or planned spinal or epidural procedure  -take medicines that treat or prevent blood clots  -an unusual or allergic reaction to rivaroxaban, other medicines, foods, dyes, or preservatives  -pregnant or trying to get pregnant  -breast-feeding  How should I use this medicine?  Take this medicine by mouth with a glass of water. Follow the directions on the prescription  label. Take your medicine at regular intervals. Do not take it more often than directed. Do not stop taking except on your doctor's advice. Stopping this medicine may increase your risk of a blood clot. Be sure to refill your prescription before you run out of medicine.  If you are taking this medicine after hip or knee replacement surgery, take it with or without food. If you are taking this medicine for atrial fibrillation, take it with your evening meal. If you are taking this medicine to treat blood clots, take it with food at the same time each day. If you are unable to swallow your tablet, you may crush the tablet and mix it in applesauce. Then, immediately eat the applesauce. You should eat more food right after you eat the applesauce containing the crushed tablet.  Talk to your pediatrician regarding the use of this medicine in children. Special care may be needed.  Overdosage: If you think you have taken too much of this medicine contact a poison control center or emergency room at once.  NOTE: This medicine is only for you. Do not share this medicine with others.  What if I miss a dose?  If you take your medicine once a day and miss a dose, take the missed dose as soon as you remember. If you take your medicine twice a day and miss a dose, take the missed dose immediately. In this instance, 2 tablets may be taken at the same time. The next day you should take 1 tablet twice a day as directed.  What may interact with this medicine?  Do not take this medicine with any of the following medications:  -defibrotide  This medicine may also interact with the following medications:  -aspirin and aspirin-like medicines  -certain antibiotics like erythromycin, azithromycin, and clarithromycin  -certain medicines for fungal infections like ketoconazole and itraconazole  -certain medicines for irregular heart beat like amiodarone, quinidine, dronedarone  -certain medicines for seizures like carbamazepine,  phenytoin  -certain medicines that treat or prevent blood clots like warfarin, enoxaparin, and dalteparin  -conivaptan  -diltiazem  -felodipine  -indinavir  -lopinavir; ritonavir  -NSAIDS, medicines for pain and inflammation, like ibuprofen or naproxen  -ranolazine  -rifampin  -ritonavir  -SNRIs, medicines for depression, like desvenlafaxine, duloxetine, levomilnacipran, venlafaxine  -SSRIs, medicines for depression, like citalopram, escitalopram, fluoxetine, fluvoxamine, paroxetine, sertraline  -Ritika's wort  -verapamil  This list may not describe all possible interactions. Give your health care provider a list of all the medicines, herbs, non-prescription drugs, or dietary supplements you use. Also tell them if you smoke, drink alcohol, or use illegal drugs. Some items may interact with your medicine.  What should I watch for while using this medicine?  Visit your doctor or health care professional for regular checks on your progress.  Notify your doctor or health care professional and seek emergency treatment if you develop breathing problems; changes in vision; chest pain; severe, sudden headache; pain, swelling, warmth in the leg; trouble speaking; sudden numbness or weakness of the face, arm or leg. These can be signs that your condition has gotten worse.  If you are going to have surgery or other procedure, tell your doctor that you are taking this medicine.  What side effects may I notice from receiving this medicine?  Side effects that you should report to your doctor or health care professional as soon as possible:  -allergic reactions like skin rash, itching or hives, swelling of the face, lips, or tongue  -back pain  -redness, blistering, peeling or loosening of the skin, including inside the mouth  -signs and symptoms of bleeding such as bloody or black, tarry stools; red or dark-brown urine; spitting up blood or brown material that looks like coffee grounds; red spots on the skin; unusual bruising  or bleeding from the eye, gums, or nose  Side effects that usually do not require medical attention (report to your doctor or health care professional if they continue or are bothersome):  -dizziness  -muscle pain  This list may not describe all possible side effects. Call your doctor for medical advice about side effects. You may report side effects to FDA at 5-114-MZV-5587.  Where should I keep my medicine?  Keep out of the reach of children.  Store at room temperature between 15 and 30 degrees C (59 and 86 degrees F). Throw away any unused medicine after the expiration date.  NOTE: This sheet is a summary. It may not cover all possible information. If you have questions about this medicine, talk to your doctor, pharmacist, or health care provider.  © 2018 [employer]/Gold Standard (2017-09-06 16:29:33)      · Is patient discharged on Warfarin / Coumadin?   No     Depression / Suicide Risk    As you are discharged from this [facility] facility, it is important to learn how to keep safe from harming yourself.    Recognize the warning signs:  · Abrupt changes in personality, positive or negative- including increase in energy   · Giving away possessions  · Change in eating patterns- significant weight changes-  positive or negative  · Change in sleeping patterns- unable to sleep or sleeping all the time   · Unwillingness or inability to communicate  · Depression  · Unusual sadness, discouragement and loneliness  · Talk of wanting to die  · Neglect of personal appearance   · Rebelliousness- reckless behavior  · Withdrawal from people/activities they love  · Confusion- inability to concentrate     If you or a loved one observes any of these behaviors or has concerns about self-harm, here's what you can do:  · Talk about it- your feelings and reasons for harming yourself  · Remove any means that you might use to hurt yourself (examples: pills, rope, extension cords, firearm)  · Get professional help from the community  (Mental Health, Substance Abuse, psychological counseling)  · Do not be alone:Call your Safe Contact- someone whom you trust who will be there for you.  · Call your local CRISIS HOTLINE 276-6048 or 700-585-0652  · Call your local Children's Mobile Crisis Response Team [place] (844) 583-7155 or www.[employer]  · Call the toll free National Suicide Prevention Hotlines   · National Suicide Prevention Lifeline 958-550-DIFV (8281)  · National Hope Line Network 800-SUICIDE (247-7145)

## 2019-06-24 NOTE — PROGRESS NOTES
Patient removed mitts with teeth, mitts left off and will monitor.  PIV is saline locked at this time.  Patient no longer has a castillo catheter.

## 2019-06-24 NOTE — CARE PLAN
Problem: Venous Thromboembolism (VTW)/Deep Vein Thrombosis (DVT) Prevention:  Goal: Patient will participate in Venous Thrombosis (VTE)/Deep Vein Thrombosis (DVT)Prevention Measures  Outcome: PROGRESSING AS EXPECTED   06/23/19 2100 06/24/19 0000   Mechanical/VTE Prophylaxis   Mechanical Prophylaxis  --  SCDs, Sequential Compression Device   SCDs, Sequential Compression Device --  On   OTHER   Risk Assessment Score 3 --    VTE RISK High --        Problem: Urinary Elimination:  Goal: Ability to reestablish a normal urinary elimination pattern will improve  Outcome: PROGRESSING AS EXPECTED   06/23/19 2100   OTHER   Urinary Elimination Incontinence   Patient is voiding as per baseline, incontinent.

## 2019-06-24 NOTE — CARE PLAN
Problem: Communication  Goal: The ability to communicate needs accurately and effectively will improve  Outcome: PROGRESSING SLOWER THAN EXPECTED  Patient confused and calls for help when RN in sight. Reinforcement needed as appropriate. Patient A&Ox1 (oriented to self) and no current reports of pain. Will continue to monitor.     Problem: Bowel/Gastric:  Goal: Normal bowel function is maintained or improved  Outcome: PROGRESSING SLOWER THAN EXPECTED  No bowel recorded in epic for this stay; however, intake has been low. Will encourage fluids and more stool softeners.

## 2019-06-24 NOTE — DISCHARGE PLANNING
Received Transport Form @ 7114  Spoke to Josefina @ [facility]    Transport is scheduled for 6/24 @1500 going to [facility].

## 2019-07-04 ENCOUNTER — TELEPHONE (OUTPATIENT)
Dept: MEDICAL GROUP | Facility: MEDICAL CENTER | Age: 84
End: 2019-07-04

## 2019-07-05 NOTE — TELEPHONE ENCOUNTER
----- Message from Janel Mary, Med Ass't sent at 7/3/2019  9:16 AM PDT -----  Regarding: FW: Procedure Question  Contact: [phone]      ----- Message -----  From: Arminda Cowart  Sent: 7/3/2019   8:20 AM  To: Ashlee Sykes [facility]  Subject: Procedure Question                               Jaymie Caballero,  (1)  Regarding my mom, Arminda Cowart. She fell, broke hip, had surgery at [facility]. In rehab & now back at [facility].  Declining before she broke hip.  Very hard time getting her to take pills. Can you cut out the vitamins & just have Tylenol & Tramadol for comfort from pain. Also trying to get her on [facility] ([phone]) will be getting her medical records from you, please expedite. Said she weighed 99 when entering rehab, Can't be as she weighed 85 at last  Visit in Feb. Said she may qualify for hospice under malnutrition but with that weight gain don't know. Maybe you could confer with her (number above) . You can call me also. Thank you!  Hansa Joel  [phone]

## 2019-07-10 PROBLEM — Z01.810 PREOPERATIVE CARDIOVASCULAR EXAMINATION: Status: RESOLVED | Noted: 2019-06-21 | Resolved: 2019-07-10

## 2019-07-10 PROBLEM — S72.002A CLOSED LEFT HIP FRACTURE (HCC): Status: RESOLVED | Noted: 2019-06-21 | Resolved: 2019-07-10

## 2019-07-10 PROBLEM — Z87.81 S/P LEFT HIP FRACTURE: Status: ACTIVE | Noted: 2019-07-10

## 2019-07-10 PROBLEM — N39.0 ACUTE UTI: Status: RESOLVED | Noted: 2019-06-21 | Resolved: 2019-07-10

## 2019-07-10 PROBLEM — K59.00 CONSTIPATED: Status: RESOLVED | Noted: 2019-06-23 | Resolved: 2019-07-10

## 2019-07-10 PROBLEM — D62 ANEMIA ASSOCIATED WITH ACUTE BLOOD LOSS: Status: RESOLVED | Noted: 2019-06-22 | Resolved: 2019-07-10

## 2019-07-10 PROBLEM — R07.9 CHEST PAIN: Status: RESOLVED | Noted: 2019-06-23 | Resolved: 2019-07-10

## 2021-01-11 DIAGNOSIS — Z23 NEED FOR VACCINATION: ICD-10-CM

## 2023-03-19 NOTE — ANESTHESIA PROCEDURE NOTES
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 spinal canal compromise. Consider MRI to assess for acuity.   3.  No high-grade spinal canal or neural foraminal stenosis.    LUMBAR SPINE CT:  1.  Minimal super endplate height loss/anterior wedging of the L2 vertebral body, new since prior exam dated 10/16/2022. No dorsal osseous retropulsion or spinal canal compromise. Consider MRI to assess for acuity.  2.  Additional findings, as above.          Dr. Huy Mills discussed results with Dr. Vasquez on 03/19/2023 at 2:35 AM.   Cervical spine MRI w/o contrast    Narrative    EXAM: MR CERVICAL SPINE WITHOUT CONTRAST, MR THORACIC SPINE WITHOUT CONTRAST, MR LUMBAR SPINE WITHOUT CONTRAST  LOCATION: [facility]  DATE/TIME: 03/19/2023, 5:24 AM    INDICATION: Cervical, thoracic and lumbar spine injuries status post fall. Traumatic injury.  COMPARISON: CT cervical, thoracic and lumbar spine dated 03/19/2023.  TECHNIQUE:   1) Routine Cervical Spine MRI without IV contrast.  2) Routine Thoracic Spine MRI without IV contrast.  3) Routine Lumbar Spine MRI without IV contrast.    FINDINGS:    CERVICAL SPINE:   There is chronic inferior endplate height loss noted involving the C6 and C7 vertebral bodies. No associated marrow edema is identified. The remaining vertebral body heights are maintained. Overall marrow signal pattern is normal for age. No suspicious   marrow edema identified involving the cervical spine. At T1, there is a chronic anterior wedge compression deformity with approximately 20% height loss noted anteriorly. Additionally,  there is subtle superior endplate scalloping identified involving the   T2 vertebral body with approximately 5% height loss which is also chronic. No marrow edema identified involving the T1 and T2 vertebral bodies. Cervical spine alignment is maintained. No abnormal cord signal. No extraspinal abnormality.    Craniovertebral junction and C1-C2: Normal.    C2-C3: Normal disc height and signal. No disc herniation. Mild facet arthrosis. No significant canal or foraminal stenosis.     C3-C4: Normal disc height. Mild disc desiccation. Trace posterior disc osteophyte complex. Minimal facet arthropathy. No significant neural foraminal stenosis. No significant canal stenosis.     C4-C5: Normal disc height and signal. No disc herniation. Minimal facet arthropathy. No canal or foraminal stenosis.     C5-C6: Normal disc height and signal. Mild bilateral facet arthropathy. No disc herniation. No canal or foraminal stenosis.     C6-C7: Normal disc height and signal. No disc herniation. No canal or foraminal stenosis.     C7-T1: Mild disc desiccation. Disc height maintained. No disc herniation. No canal or foraminal stenosis.    THORACIC SPINE:  There is mild anterior wedging of the T1 vertebral body with approximately 20% height loss. No associated marrow edema identified to suggest acuity. Subtle superior endplate scalloping identified involving T2, without associated marrow edema, is favored   to be chronic.    There is superior endplate height loss with anterior wedging involving the T9 vertebral body measuring approximately 20%. There is associated marrow edema identified involving the T9 superior endplate in keeping with acute superior endplate compression   fracture deformity.    At T10, there is subtle superior endplate scalloping with associated marrow edema in keeping with an acute superior endplate compression deformity. There is approximately 20% height loss noted centrally at T10.    At T11, there is minimal  associated superior endplate marrow edema with approximately 15% superior endplate height loss and anterior wedging.     At T12, there is approximately 10% height loss with subtle marrow edema noted involving the superior endplate.     No dorsal osseous retropulsion or spinal canal compromise is identified. No definite marrow edema is identified extending into the posterior elements.    Sagittal thoracic spine alignment is maintained. Mild disc desiccation and disc height loss is noted at the levels of T5-T6, T6-T7, and T7-T8. Mild mid to lower advanced for age facet arthropathy is noted at multiple levels. There are no significant disc   herniations. There is no significant central spinal canal or neural foraminal stenosis. There is no abnormal spinal cord signal.     No extraspinal abnormality.    LUMBAR SPINE:   Nomenclature is based on 5 lumbar type vertebral bodies. Question trace marrow edema identified involving the right superior endplate of L1 (image 8 series 5) without associated height loss. There is subtle patchy marrow edema identified involving the L2   vertebral body with minimal associated superior endplate height loss, in keeping with an acute superior endplate compression deformity. Minimal edema is noted extending into the proximal left pedicle at L2. The remaining vertebral body heights are   maintained. Lumbar spine alignment is maintained. Normal distal spinal cord and cauda equina with conus medullaris at L1-L2. No extraspinal abnormality. Unremarkable visualized bony pelvis.    T12-L1: Normal disc height and signal. No disc herniation. No canal or foraminal stenosis.     L1-L2: Normal disc height and signal. No disc herniation. No canal or foraminal stenosis.    L2-L3: Normal disc height and signal. No disc herniation. No canal or foraminal stenosis.     L3-L4: Normal disc height and signal. Normal facets for age. Trace concentric bulging of the disc, likely physiologic. No canal stenosis. No  foraminal stenosis.    L4-L5: Normal disc height and signal. Bilateral facet arthropathy. Trace concentric disc bulging of the disc. This may be physiologic. No significant canal stenosis identified. There is moderate bilateral neural foraminal stenosis at this level. Trace   fluid is noted in the facet joints.    L5-S1: Mild disc height loss. Normal disc signal. Mild bilateral facet arthropathy. Shallow broad-based disc bulge. Minimal central spinal canal stenosis. Moderate right neural foraminal stenosis. Moderate left neural foraminal stenosis.      Impression    IMPRESSION:  CERVICAL SPINE MRI:  1.  No acute cervical spine posttraumatic injury.   2.  No abnormal spinal cord signal.  3.  No significant canal or neural foraminal stenosis.    THORACIC SPINE MRI:  1.  Acute superior endplate compression fracture deformities identified at T9, T10, T11 and T12. No dorsal osseous retropulsion or spinal canal compromise.    LUMBAR SPINE MRI:  1.  Subtle acute superior endplate compression deformity identified involving the L2 vertebral body. No dorsal osseous retropulsion or spinal canal compromise.  2.  Question trace marrow edema identified involving the right superior endplate of L1 without associated height loss.  3.  Minimal central spinal canal stenosis at L5-S1 with moderate bilateral neural foraminal stenosis.  4.  Moderate bilateral neural foraminal stenosis at L4-L5.       Thoracic spine MRI w/o contrast    Narrative    EXAM: MR CERVICAL SPINE WITHOUT CONTRAST, MR THORACIC SPINE WITHOUT CONTRAST, MR LUMBAR SPINE WITHOUT CONTRAST  LOCATION: [facility]  DATE/TIME: 03/19/2023, 5:24 AM    INDICATION: Cervical, thoracic and lumbar spine injuries status post fall. Traumatic injury.  COMPARISON: CT cervical, thoracic and lumbar spine dated 03/19/2023.  TECHNIQUE:   1) Routine Cervical Spine MRI without IV contrast.  2) Routine Thoracic Spine MRI without IV contrast.  3) Routine Lumbar Spine  MRI without IV contrast.    FINDINGS:    CERVICAL SPINE:   There is chronic inferior endplate height loss noted involving the C6 and C7 vertebral bodies. No associated marrow edema is identified. The remaining vertebral body heights are maintained. Overall marrow signal pattern is normal for age. No suspicious   marrow edema identified involving the cervical spine. At T1, there is a chronic anterior wedge compression deformity with approximately 20% height loss noted anteriorly. Additionally, there is subtle superior endplate scalloping identified involving the   T2 vertebral body with approximately 5% height loss which is also chronic. No marrow edema identified involving the T1 and T2 vertebral bodies. Cervical spine alignment is maintained. No abnormal cord signal. No extraspinal abnormality.    Craniovertebral junction and C1-C2: Normal.    C2-C3: Normal disc height and signal. No disc herniation. Mild facet arthrosis. No significant canal or foraminal stenosis.     C3-C4: Normal disc height. Mild disc desiccation. Trace posterior disc osteophyte complex. Minimal facet arthropathy. No significant neural foraminal stenosis. No significant canal stenosis.     C4-C5: Normal disc height and signal. No disc herniation. Minimal facet arthropathy. No canal or foraminal stenosis.     C5-C6: Normal disc height and signal. Mild bilateral facet arthropathy. No disc herniation. No canal or foraminal stenosis.     C6-C7: Normal disc height and signal. No disc herniation. No canal or foraminal stenosis.     C7-T1: Mild disc desiccation. Disc height maintained. No disc herniation. No canal or foraminal stenosis.    THORACIC SPINE:  There is mild anterior wedging of the T1 vertebral body with approximately 20% height loss. No associated marrow edema identified to suggest acuity. Subtle superior endplate scalloping identified involving T2, without associated marrow edema, is favored   to be chronic.    There is superior endplate  height loss with anterior wedging involving the T9 vertebral body measuring approximately 20%. There is associated marrow edema identified involving the T9 superior endplate in keeping with acute superior endplate compression   fracture deformity.    At T10, there is subtle superior endplate scalloping with associated marrow edema in keeping with an acute superior endplate compression deformity. There is approximately 20% height loss noted centrally at T10.    At T11, there is minimal associated superior endplate marrow edema with approximately 15% superior endplate height loss and anterior wedging.     At T12, there is approximately 10% height loss with subtle marrow edema noted involving the superior endplate.     No dorsal osseous retropulsion or spinal canal compromise is identified. No definite marrow edema is identified extending into the posterior elements.    Sagittal thoracic spine alignment is maintained. Mild disc desiccation and disc height loss is noted at the levels of T5-T6, T6-T7, and T7-T8. Mild mid to lower advanced for age facet arthropathy is noted at multiple levels. There are no significant disc   herniations. There is no significant central spinal canal or neural foraminal stenosis. There is no abnormal spinal cord signal.     No extraspinal abnormality.    LUMBAR SPINE:   Nomenclature is based on 5 lumbar type vertebral bodies. Question trace marrow edema identified involving the right superior endplate of L1 (image 8 series 5) without associated height loss. There is subtle patchy marrow edema identified involving the L2   vertebral body with minimal associated superior endplate height loss, in keeping with an acute superior endplate compression deformity. Minimal edema is noted extending into the proximal left pedicle at L2. The remaining vertebral body heights are   maintained. Lumbar spine alignment is maintained. Normal distal spinal cord and cauda equina with conus medullaris at L1-L2.  No extraspinal abnormality. Unremarkable visualized bony pelvis.    T12-L1: Normal disc height and signal. No disc herniation. No canal or foraminal stenosis.     L1-L2: Normal disc height and signal. No disc herniation. No canal or foraminal stenosis.    L2-L3: Normal disc height and signal. No disc herniation. No canal or foraminal stenosis.     L3-L4: Normal disc height and signal. Normal facets for age. Trace concentric bulging of the disc, likely physiologic. No canal stenosis. No foraminal stenosis.    L4-L5: Normal disc height and signal. Bilateral facet arthropathy. Trace concentric disc bulging of the disc. This may be physiologic. No significant canal stenosis identified. There is moderate bilateral neural foraminal stenosis at this level. Trace   fluid is noted in the facet joints.    L5-S1: Mild disc height loss. Normal disc signal. Mild bilateral facet arthropathy. Shallow broad-based disc bulge. Minimal central spinal canal stenosis. Moderate right neural foraminal stenosis. Moderate left neural foraminal stenosis.      Impression    IMPRESSION:  CERVICAL SPINE MRI:  1.  No acute cervical spine posttraumatic injury.   2.  No abnormal spinal cord signal.  3.  No significant canal or neural foraminal stenosis.    THORACIC SPINE MRI:  1.  Acute superior endplate compression fracture deformities identified at T9, T10, T11 and T12. No dorsal osseous retropulsion or spinal canal compromise.    LUMBAR SPINE MRI:  1.  Subtle acute superior endplate compression deformity identified involving the L2 vertebral body. No dorsal osseous retropulsion or spinal canal compromise.  2.  Question trace marrow edema identified involving the right superior endplate of L1 without associated height loss.  3.  Minimal central spinal canal stenosis at L5-S1 with moderate bilateral neural foraminal stenosis.  4.  Moderate bilateral neural foraminal stenosis at L4-L5.       Lumbar spine MRI w/o contrast    Narrative    EXAM: MR  CERVICAL SPINE WITHOUT CONTRAST, MR THORACIC SPINE WITHOUT CONTRAST, MR LUMBAR SPINE WITHOUT CONTRAST  LOCATION: [facility]  DATE/TIME: 03/19/2023, 5:24 AM    INDICATION: Cervical, thoracic and lumbar spine injuries status post fall. Traumatic injury.  COMPARISON: CT cervical, thoracic and lumbar spine dated 03/19/2023.  TECHNIQUE:   1) Routine Cervical Spine MRI without IV contrast.  2) Routine Thoracic Spine MRI without IV contrast.  3) Routine Lumbar Spine MRI without IV contrast.    FINDINGS:    CERVICAL SPINE:   There is chronic inferior endplate height loss noted involving the C6 and C7 vertebral bodies. No associated marrow edema is identified. The remaining vertebral body heights are maintained. Overall marrow signal pattern is normal for age. No suspicious   marrow edema identified involving the cervical spine. At T1, there is a chronic anterior wedge compression deformity with approximately 20% height loss noted anteriorly. Additionally, there is subtle superior endplate scalloping identified involving the   T2 vertebral body with approximately 5% height loss which is also chronic. No marrow edema identified involving the T1 and T2 vertebral bodies. Cervical spine alignment is maintained. No abnormal cord signal. No extraspinal abnormality.    Craniovertebral junction and C1-C2: Normal.    C2-C3: Normal disc height and signal. No disc herniation. Mild facet arthrosis. No significant canal or foraminal stenosis.     C3-C4: Normal disc height. Mild disc desiccation. Trace posterior disc osteophyte complex. Minimal facet arthropathy. No significant neural foraminal stenosis. No significant canal stenosis.     C4-C5: Normal disc height and signal. No disc herniation. Minimal facet arthropathy. No canal or foraminal stenosis.     C5-C6: Normal disc height and signal. Mild bilateral facet arthropathy. No disc herniation. No canal or foraminal stenosis.     C6-C7: Normal disc height and  signal. No disc herniation. No canal or foraminal stenosis.     C7-T1: Mild disc desiccation. Disc height maintained. No disc herniation. No canal or foraminal stenosis.    THORACIC SPINE:  There is mild anterior wedging of the T1 vertebral body with approximately 20% height loss. No associated marrow edema identified to suggest acuity. Subtle superior endplate scalloping identified involving T2, without associated marrow edema, is favored   to be chronic.    There is superior endplate height loss with anterior wedging involving the T9 vertebral body measuring approximately 20%. There is associated marrow edema identified involving the T9 superior endplate in keeping with acute superior endplate compression   fracture deformity.    At T10, there is subtle superior endplate scalloping with associated marrow edema in keeping with an acute superior endplate compression deformity. There is approximately 20% height loss noted centrally at T10.    At T11, there is minimal associated superior endplate marrow edema with approximately 15% superior endplate height loss and anterior wedging.     At T12, there is approximately 10% height loss with subtle marrow edema noted involving the superior endplate.     No dorsal osseous retropulsion or spinal canal compromise is identified. No definite marrow edema is identified extending into the posterior elements.    Sagittal thoracic spine alignment is maintained. Mild disc desiccation and disc height loss is noted at the levels of T5-T6, T6-T7, and T7-T8. Mild mid to lower advanced for age facet arthropathy is noted at multiple levels. There are no significant disc   herniations. There is no significant central spinal canal or neural foraminal stenosis. There is no abnormal spinal cord signal.     No extraspinal abnormality.    LUMBAR SPINE:   Nomenclature is based on 5 lumbar type vertebral bodies. Question trace marrow edema identified involving the right superior endplate of L1  (image 8 series 5) without associated height loss. There is subtle patchy marrow edema identified involving the L2   vertebral body with minimal associated superior endplate height loss, in keeping with an acute superior endplate compression deformity. Minimal edema is noted extending into the proximal left pedicle at L2. The remaining vertebral body heights are   maintained. Lumbar spine alignment is maintained. Normal distal spinal cord and cauda equina with conus medullaris at L1-L2. No extraspinal abnormality. Unremarkable visualized bony pelvis.    T12-L1: Normal disc height and signal. No disc herniation. No canal or foraminal stenosis.     L1-L2: Normal disc height and signal. No disc herniation. No canal or foraminal stenosis.    L2-L3: Normal disc height and signal. No disc herniation. No canal or foraminal stenosis.     L3-L4: Normal disc height and signal. Normal facets for age. Trace concentric bulging of the disc, likely physiologic. No canal stenosis. No foraminal stenosis.    L4-L5: Normal disc height and signal. Bilateral facet arthropathy. Trace concentric disc bulging of the disc. This may be physiologic. No significant canal stenosis identified. There is moderate bilateral neural foraminal stenosis at this level. Trace   fluid is noted in the facet joints.    L5-S1: Mild disc height loss. Normal disc signal. Mild bilateral facet arthropathy. Shallow broad-based disc bulge. Minimal central spinal canal stenosis. Moderate right neural foraminal stenosis. Moderate left neural foraminal stenosis.      Impression    IMPRESSION:  CERVICAL SPINE MRI:  1.  No acute cervical spine posttraumatic injury.   2.  No abnormal spinal cord signal.  3.  No significant canal or neural foraminal stenosis.    THORACIC SPINE MRI:  1.  Acute superior endplate compression fracture deformities identified at T9, T10, T11 and T12. No dorsal osseous retropulsion or spinal canal compromise.    LUMBAR SPINE MRI:  1.  Subtle  acute superior endplate compression deformity identified involving the L2 vertebral body. No dorsal osseous retropulsion or spinal canal compromise.  2.  Question trace marrow edema identified involving the right superior endplate of L1 without associated height loss.  3.  Minimal central spinal canal stenosis at L5-S1 with moderate bilateral neural foraminal stenosis.  4.  Moderate bilateral neural foraminal stenosis at L4-L5.             LABS:   Last Comprehensive Metabolic Panel:  Lab Results   Component Value Date     10/16/2022    POTASSIUM 3.7 10/16/2022    CHLORIDE 102 10/16/2022    CO2 26 10/16/2022    ANIONGAP 10 10/16/2022     (H) 03/07/2023    BUN 11 10/16/2022    CR 0.68 10/16/2022    GFRESTIMATED  10/16/2022      Comment:      GFR not calculated, patient <18 years old.  Effective December 21, 2021 eGFRcr in adults is calculated using the 2021 CKD-EPI creatinine equation which includes age and gender (Grace et al., [place], DOI: 10.1056/PRFEwz7946557)    WINSTON 9.6 10/16/2022         Lab Results   Component Value Date    WBC 8.1 10/16/2022     Lab Results   Component Value Date    RBC 4.77 10/16/2022     Lab Results   Component Value Date    HGB 13.4 10/16/2022     Lab Results   Component Value Date    HCT 39.0 10/16/2022     Lab Results   Component Value Date    MCV 82 10/16/2022     Lab Results   Component Value Date    MCH 28.1 10/16/2022     Lab Results   Component Value Date    MCHC 34.4 10/16/2022     Lab Results   Component Value Date    RDW 13.0 10/16/2022     Lab Results   Component Value Date     10/16/2022     ASSESSMENT:  Glen Combs is a 13 year old male with PMHx of ADHD/depression who presents after a  downhill skiing accident, falling from 20 feet on his back with multiple compression fractures T9-T12, L2 with no cord compression. He has diffuse back pain, with pinpoint tenderness in the lower thoracic area. He has some lower extremity hyperreflexia but no other focal  deficits.     Clinically Significant Risk Factors Present on Admission   None    RECOMMENDATIONS:  No neurosurgical intervention indicated at this time   TLSO brace when OOB at all times for 3 months  Upright and supine AP and lateral thoracolumbar xrays with brace  Follow up with Neurosurgery clinic in 6 weeks  Neurosurgery will follow closely    Wiley Presley MD  Neurosurgery Resident  Pager- [phone]    The patient was discussed with Dr. Charles, neurosurgery chief resident, and Dr. Lang, neurosurgery staff.    Please contact neurosurgery resident on call with questions.    Dial * * [phone], enter [phone] when prompted.

## (undated) DEVICE — PROTECTOR ULNA NERVE - (36PR/CA)

## (undated) DEVICE — GLOVE BIOGEL SZ 8 SURGICAL PF LTX - (50PR/BX 4BX/CA)

## (undated) DEVICE — DRESSING AQUACEL AG ADVANTAGE 3.5 X 10" (10EA/BX)"

## (undated) DEVICE — ROD GUIDE R-T TRIGEN 3 X 1000 (1EA)

## (undated) DEVICE — SPONGE GAUZE STER 4X4 8-PL - (2/PK 50PK/BX 12BX/CS)

## (undated) DEVICE — LACTATED RINGERS INJ 1000 ML - (14EA/CA 60CA/PF)

## (undated) DEVICE — ELECTRODE 850 FOAM ADHESIVE - HYDROGEL RADIOTRNSPRNT (50/PK)

## (undated) DEVICE — WATER IRRIGATION STERILE 1000ML (12EA/CA)

## (undated) DEVICE — WIRE GUIDE R-T TRIGEN 3.2MM (1EA)

## (undated) DEVICE — TAPE CLOTH MEDIPORE 6 INCH - (12RL/CA)

## (undated) DEVICE — CANISTER SUCTION RIGID RED 1500CC (40EA/CA)

## (undated) DEVICE — HEAD HOLDER JUNIOR/ADULT

## (undated) DEVICE — MASK ANESTHESIA ADULT  - (100/CA)

## (undated) DEVICE — SUTURE 2-0 VICRYL PLUS CT-1 - 8 X 18 INCH(12/BX)

## (undated) DEVICE — MASK AIRWAY SIZE 3 UNIQUE SILICON (10/BX)

## (undated) DEVICE — STAPLER SKIN DISP - (6/BX 10BX/CA) VISISTAT

## (undated) DEVICE — GOWN WARMING STANDARD FLEX - (30/CA)

## (undated) DEVICE — SUCTION INSTRUMENT YANKAUER BULBOUS TIP W/O VENT (50EA/CA)

## (undated) DEVICE — HUMID-VENT HEAT AND MOISTURE EXCHANGE- (50/BX)

## (undated) DEVICE — GLOVE, LITE (PAIR)

## (undated) DEVICE — SPONGE GAUZESTER 4 X 4 4PLY - (128PK/CA)

## (undated) DEVICE — DRESSING XEROFORM 1X8 - (50/BX 4BX/CA)

## (undated) DEVICE — DRAPE SURGICAL U 77X120 - (10/CA)

## (undated) DEVICE — KIT ANESTHESIA W/CIRCUIT & 3/LT BAG W/FILTER (20EA/CA)

## (undated) DEVICE — SUTURE GENERAL

## (undated) DEVICE — ELECTRODE DUAL RETURN W/ CORD - (50/PK)

## (undated) DEVICE — SENSOR SPO2 NEO LNCS ADHESIVE (20/BX) SEE USER NOTES

## (undated) DEVICE — SLEEVE, SEQUENTIAL CALF REG

## (undated) DEVICE — PAD LAP STERILE 18 X 18 - (5/PK 40PK/CA)

## (undated) DEVICE — BAG, SPONGE COUNT 50600

## (undated) DEVICE — SODIUM CHL IRRIGATION 0.9% 1000ML (12EA/CA)

## (undated) DEVICE — TUBE CONNECTING SUCTION - CLEAR PLASTIC STERILE 72 IN (50EA/CA)

## (undated) DEVICE — PACK MINOR BASIN - (2EA/CA)

## (undated) DEVICE — KIT ROOM DECONTAMINATION

## (undated) DEVICE — NEPTUNE 4 PORT MANIFOLD - (20/PK)